# Patient Record
Sex: MALE | Race: WHITE | Employment: OTHER | URBAN - METROPOLITAN AREA
[De-identification: names, ages, dates, MRNs, and addresses within clinical notes are randomized per-mention and may not be internally consistent; named-entity substitution may affect disease eponyms.]

---

## 2017-03-08 ENCOUNTER — LAB ENCOUNTER (OUTPATIENT)
Dept: LAB | Age: 72
End: 2017-03-08
Attending: INTERNAL MEDICINE
Payer: MEDICARE

## 2017-03-08 DIAGNOSIS — Z12.5 ENCOUNTER FOR PROSTATE CANCER SCREENING: ICD-10-CM

## 2017-03-08 DIAGNOSIS — Z11.59 NEED FOR HEPATITIS C SCREENING TEST: ICD-10-CM

## 2017-03-08 DIAGNOSIS — E11.9 TYPE II OR UNSPECIFIED TYPE DIABETES MELLITUS WITHOUT MENTION OF COMPLICATION, NOT STATED AS UNCONTROLLED: ICD-10-CM

## 2017-03-08 DIAGNOSIS — I10 ESSENTIAL HYPERTENSION: ICD-10-CM

## 2017-03-08 LAB
ALBUMIN SERPL BCP-MCNC: 4.2 G/DL (ref 3.5–4.8)
ALBUMIN/GLOB SERPL: 1.2 {RATIO} (ref 1–2)
ALP SERPL-CCNC: 50 U/L (ref 32–100)
ALT SERPL-CCNC: 37 U/L (ref 17–63)
ANION GAP SERPL CALC-SCNC: 9 MMOL/L (ref 0–18)
AST SERPL-CCNC: 31 U/L (ref 15–41)
BACTERIA UR QL AUTO: NEGATIVE /HPF
BASOPHILS # BLD: 0 K/UL (ref 0–0.2)
BASOPHILS NFR BLD: 1 %
BILIRUB SERPL-MCNC: 1.1 MG/DL (ref 0.3–1.2)
BILIRUB UR QL: NEGATIVE
BUN SERPL-MCNC: 13 MG/DL (ref 8–20)
BUN/CREAT SERPL: 10.4 (ref 10–20)
CALCIUM SERPL-MCNC: 9.3 MG/DL (ref 8.5–10.5)
CHLORIDE SERPL-SCNC: 102 MMOL/L (ref 95–110)
CHOLEST SERPL-MCNC: 159 MG/DL (ref 110–200)
CLARITY UR: CLEAR
CO2 SERPL-SCNC: 29 MMOL/L (ref 22–32)
COLOR UR: YELLOW
CREAT SERPL-MCNC: 1.25 MG/DL (ref 0.5–1.5)
EOSINOPHIL # BLD: 0.1 K/UL (ref 0–0.7)
EOSINOPHIL NFR BLD: 1 %
ERYTHROCYTE [DISTWIDTH] IN BLOOD BY AUTOMATED COUNT: 14.3 % (ref 11–15)
GLOBULIN PLAS-MCNC: 3.4 G/DL (ref 2.5–3.7)
GLUCOSE SERPL-MCNC: 104 MG/DL (ref 70–99)
GLUCOSE UR-MCNC: NEGATIVE MG/DL
HCT VFR BLD AUTO: 45 % (ref 41–52)
HDLC SERPL-MCNC: 30 MG/DL
HGB BLD-MCNC: 14.9 G/DL (ref 13.5–17.5)
HYALINE CASTS #/AREA URNS AUTO: 1 /LPF
KETONES UR-MCNC: NEGATIVE MG/DL
LDLC SERPL CALC-MCNC: 106 MG/DL (ref 0–99)
LEUKOCYTE ESTERASE UR QL STRIP.AUTO: NEGATIVE
LYMPHOCYTES # BLD: 2.9 K/UL (ref 1–4)
LYMPHOCYTES NFR BLD: 44 %
MCH RBC QN AUTO: 29.5 PG (ref 27–32)
MCHC RBC AUTO-ENTMCNC: 33.2 G/DL (ref 32–37)
MCV RBC AUTO: 88.9 FL (ref 80–100)
MONOCYTES # BLD: 0.7 K/UL (ref 0–1)
MONOCYTES NFR BLD: 11 %
NEUTROPHILS # BLD AUTO: 3 K/UL (ref 1.8–7.7)
NEUTROPHILS NFR BLD: 44 %
NITRITE UR QL STRIP.AUTO: NEGATIVE
NONHDLC SERPL-MCNC: 129 MG/DL
OSMOLALITY UR CALC.SUM OF ELEC: 290 MOSM/KG (ref 275–295)
PH UR: 6 [PH] (ref 5–8)
PLATELET # BLD AUTO: 160 K/UL (ref 140–400)
PMV BLD AUTO: 8.9 FL (ref 7.4–10.3)
POTASSIUM SERPL-SCNC: 4.5 MMOL/L (ref 3.3–5.1)
PROT SERPL-MCNC: 7.6 G/DL (ref 5.9–8.4)
PROT UR-MCNC: NEGATIVE MG/DL
PSA SERPL-MCNC: 0.5 NG/ML (ref 0–4)
RBC # BLD AUTO: 5.05 M/UL (ref 4.5–5.9)
RBC #/AREA URNS AUTO: 0 /HPF
SODIUM SERPL-SCNC: 140 MMOL/L (ref 136–144)
SP GR UR STRIP: 1.01 (ref 1–1.03)
TRIGL SERPL-MCNC: 113 MG/DL (ref 1–149)
TSH SERPL-ACNC: 1.48 UIU/ML (ref 0.34–5.6)
UROBILINOGEN UR STRIP-ACNC: <2
VIT C UR-MCNC: NEGATIVE MG/DL
WBC # BLD AUTO: 6.7 K/UL (ref 4–11)
WBC #/AREA URNS AUTO: 1 /HPF

## 2017-03-08 PROCEDURE — 86803 HEPATITIS C AB TEST: CPT

## 2017-03-08 PROCEDURE — 84443 ASSAY THYROID STIM HORMONE: CPT

## 2017-03-08 PROCEDURE — 83036 HEMOGLOBIN GLYCOSYLATED A1C: CPT

## 2017-03-08 PROCEDURE — 80053 COMPREHEN METABOLIC PANEL: CPT

## 2017-03-08 PROCEDURE — 85025 COMPLETE CBC W/AUTO DIFF WBC: CPT

## 2017-03-08 PROCEDURE — 80061 LIPID PANEL: CPT

## 2017-03-08 PROCEDURE — 81001 URINALYSIS AUTO W/SCOPE: CPT

## 2017-03-08 PROCEDURE — 36415 COLL VENOUS BLD VENIPUNCTURE: CPT

## 2017-03-09 LAB
HBA1C MFR BLD: 5.8 % (ref 4–6)
HCV AB SERPL QL IA: NONREACTIVE

## 2017-03-13 ENCOUNTER — OFFICE VISIT (OUTPATIENT)
Dept: INTERNAL MEDICINE CLINIC | Facility: CLINIC | Age: 72
End: 2017-03-13

## 2017-03-13 VITALS
BODY MASS INDEX: 27.49 KG/M2 | TEMPERATURE: 99 F | HEART RATE: 68 BPM | WEIGHT: 192 LBS | SYSTOLIC BLOOD PRESSURE: 134 MMHG | HEIGHT: 70 IN | DIASTOLIC BLOOD PRESSURE: 64 MMHG

## 2017-03-13 DIAGNOSIS — I10 ESSENTIAL HYPERTENSION: ICD-10-CM

## 2017-03-13 DIAGNOSIS — Z00.00 ENCOUNTER FOR MEDICARE ANNUAL WELLNESS EXAM: Primary | ICD-10-CM

## 2017-03-13 DIAGNOSIS — E11.8 TYPE 2 DIABETES MELLITUS WITH COMPLICATION, WITHOUT LONG-TERM CURRENT USE OF INSULIN (HCC): ICD-10-CM

## 2017-03-13 DIAGNOSIS — Z00.00 ENCOUNTER FOR INITIAL PREVENTIVE PHYSICAL EXAMINATION COVERED BY MEDICARE: ICD-10-CM

## 2017-03-13 PROCEDURE — 93010 ELECTROCARDIOGRAM REPORT: CPT | Performed by: INTERNAL MEDICINE

## 2017-03-13 PROCEDURE — 99214 OFFICE O/P EST MOD 30 MIN: CPT | Performed by: INTERNAL MEDICINE

## 2017-03-13 PROCEDURE — 93005 ELECTROCARDIOGRAM TRACING: CPT | Performed by: INTERNAL MEDICINE

## 2017-03-13 PROCEDURE — G0463 HOSPITAL OUTPT CLINIC VISIT: HCPCS | Performed by: INTERNAL MEDICINE

## 2017-03-13 PROCEDURE — G0439 PPPS, SUBSEQ VISIT: HCPCS | Performed by: INTERNAL MEDICINE

## 2017-03-13 NOTE — PROGRESS NOTES
Collin Springer is a 66year old male. HPI:   1. Encounter for Medicare annual wellness exam -  He is here for annual [facility] wellness exam. He feels well and has no major complaints. Good energy, good appetite.      SR: no chest pain or sob, no gu or gi sx poor?: No    Type of Diet: Balanced    How does the patient maintain a good energy level?: Daily Walks; Other    How would you describe your daily physical activity?: Moderate    How would you describe your current health state?: Good    How do you maintain AWV/SWV)    Hearing Screening    Screening Method:  Questionnaire      I have a problem hearing over the telephone:  No I have trouble following   the conversations when two or more people are talking at the same time:    No   I have trouble understanding Diabetes Screening      HbgA1C   Annually GLYCOHEMOGLOBIN (HGA1C) (L) (%)   Date Value   03/11/2016 5.5     GLYCOHEMOGLOBIN (HGA1C) (%)   Date Value   03/08/2017 5.8    No flowsheet data found.     Fasting Blood Sugar (FSB) Annually   GLUCOSE (mg/dL)   Da Creatinine  Annually CREATININE (mg/dL)   Date Value   03/08/2017 1.25     CREATININE (P) (mg/dL)   Date Value   03/11/2016 1.13    No flowsheet data found. Digoxin Serum Conc  Annually No results found for: DIGOXIN No flowsheet data found.     Diabetes

## 2017-07-05 RX ORDER — ENALAPRIL MALEATE 10 MG/1
TABLET ORAL
Qty: 90 TABLET | Refills: 3 | Status: SHIPPED | OUTPATIENT
Start: 2017-07-05 | End: 2018-08-01

## 2018-03-07 ENCOUNTER — TELEPHONE (OUTPATIENT)
Dept: INTERNAL MEDICINE CLINIC | Facility: CLINIC | Age: 73
End: 2018-03-07

## 2018-03-07 DIAGNOSIS — Z12.5 SCREENING FOR PROSTATE CANCER: ICD-10-CM

## 2018-03-07 DIAGNOSIS — E13.9 DIABETES 1.5, MANAGED AS TYPE 2 (HCC): ICD-10-CM

## 2018-03-07 DIAGNOSIS — Z00.00 ANNUAL PHYSICAL EXAM: ICD-10-CM

## 2018-03-07 DIAGNOSIS — I10 ESSENTIAL HYPERTENSION: Primary | ICD-10-CM

## 2018-03-07 NOTE — TELEPHONE ENCOUNTER
Patient is asking if he needs lab work prior to 3/14/2018 appointment with Dr. Roberta Knapp. Please call him back at [phone], may leave message on machine.   Routed to clinical.

## 2018-03-08 ENCOUNTER — LAB ENCOUNTER (OUTPATIENT)
Dept: LAB | Age: 73
End: 2018-03-08
Attending: INTERNAL MEDICINE
Payer: MEDICARE

## 2018-03-08 DIAGNOSIS — I10 ESSENTIAL HYPERTENSION: ICD-10-CM

## 2018-03-08 DIAGNOSIS — Z00.00 ANNUAL PHYSICAL EXAM: ICD-10-CM

## 2018-03-08 DIAGNOSIS — E13.9 DIABETES 1.5, MANAGED AS TYPE 2 (HCC): ICD-10-CM

## 2018-03-08 DIAGNOSIS — Z12.5 SCREENING FOR PROSTATE CANCER: ICD-10-CM

## 2018-03-08 LAB
ALBUMIN SERPL BCP-MCNC: 4.3 G/DL (ref 3.5–4.8)
ALBUMIN/GLOB SERPL: 1.4 {RATIO} (ref 1–2)
ALP SERPL-CCNC: 54 U/L (ref 32–100)
ALT SERPL-CCNC: 33 U/L (ref 17–63)
ANION GAP SERPL CALC-SCNC: 9 MMOL/L (ref 0–18)
AST SERPL-CCNC: 26 U/L (ref 15–41)
BASOPHILS # BLD: 0 K/UL (ref 0–0.2)
BASOPHILS NFR BLD: 1 %
BILIRUB SERPL-MCNC: 0.9 MG/DL (ref 0.3–1.2)
BILIRUB UR QL: NEGATIVE
BUN SERPL-MCNC: 15 MG/DL (ref 8–20)
BUN/CREAT SERPL: 12.7 (ref 10–20)
CALCIUM SERPL-MCNC: 9.3 MG/DL (ref 8.5–10.5)
CHLORIDE SERPL-SCNC: 102 MMOL/L (ref 95–110)
CHOLEST SERPL-MCNC: 160 MG/DL (ref 110–200)
CLARITY UR: CLEAR
CO2 SERPL-SCNC: 27 MMOL/L (ref 22–32)
COLOR UR: YELLOW
CREAT SERPL-MCNC: 1.18 MG/DL (ref 0.5–1.5)
EOSINOPHIL # BLD: 0.1 K/UL (ref 0–0.7)
EOSINOPHIL NFR BLD: 2 %
ERYTHROCYTE [DISTWIDTH] IN BLOOD BY AUTOMATED COUNT: 13.8 % (ref 11–15)
GLOBULIN PLAS-MCNC: 3.1 G/DL (ref 2.5–3.7)
GLUCOSE SERPL-MCNC: 99 MG/DL (ref 70–99)
GLUCOSE UR-MCNC: NEGATIVE MG/DL
HBA1C MFR BLD: 5.7 % (ref 4–6)
HCT VFR BLD AUTO: 44.3 % (ref 41–52)
HDLC SERPL-MCNC: 33 MG/DL
HGB BLD-MCNC: 14.6 G/DL (ref 13.5–17.5)
HGB UR QL STRIP.AUTO: NEGATIVE
KETONES UR-MCNC: NEGATIVE MG/DL
LDLC SERPL CALC-MCNC: 106 MG/DL (ref 0–99)
LEUKOCYTE ESTERASE UR QL STRIP.AUTO: NEGATIVE
LYMPHOCYTES # BLD: 3.5 K/UL (ref 1–4)
LYMPHOCYTES NFR BLD: 44 %
MCH RBC QN AUTO: 29.4 PG (ref 27–32)
MCHC RBC AUTO-ENTMCNC: 32.9 G/DL (ref 32–37)
MCV RBC AUTO: 89.2 FL (ref 80–100)
MONOCYTES # BLD: 0.8 K/UL (ref 0–1)
MONOCYTES NFR BLD: 11 %
NEUTROPHILS # BLD AUTO: 3.4 K/UL (ref 1.8–7.7)
NEUTROPHILS NFR BLD: 43 %
NITRITE UR QL STRIP.AUTO: NEGATIVE
NONHDLC SERPL-MCNC: 127 MG/DL
OSMOLALITY UR CALC.SUM OF ELEC: 287 MOSM/KG (ref 275–295)
PATIENT FASTING: YES
PH UR: 5 [PH] (ref 5–8)
PLATELET # BLD AUTO: 189 K/UL (ref 140–400)
PMV BLD AUTO: 9.3 FL (ref 7.4–10.3)
POTASSIUM SERPL-SCNC: 4.5 MMOL/L (ref 3.3–5.1)
PROT SERPL-MCNC: 7.4 G/DL (ref 5.9–8.4)
PROT UR-MCNC: NEGATIVE MG/DL
PSA SERPL-MCNC: 0.7 NG/ML (ref 0–4)
RBC # BLD AUTO: 4.97 M/UL (ref 4.5–5.9)
SODIUM SERPL-SCNC: 138 MMOL/L (ref 136–144)
SP GR UR STRIP: 1.02 (ref 1–1.03)
TRIGL SERPL-MCNC: 107 MG/DL (ref 1–149)
TSH SERPL-ACNC: 1.49 UIU/ML (ref 0.45–5.33)
UROBILINOGEN UR STRIP-ACNC: <2
VIT C UR-MCNC: NEGATIVE MG/DL
WBC # BLD AUTO: 7.9 K/UL (ref 4–11)

## 2018-03-08 PROCEDURE — 85025 COMPLETE CBC W/AUTO DIFF WBC: CPT

## 2018-03-08 PROCEDURE — 81003 URINALYSIS AUTO W/O SCOPE: CPT

## 2018-03-08 PROCEDURE — 84443 ASSAY THYROID STIM HORMONE: CPT

## 2018-03-08 PROCEDURE — 83036 HEMOGLOBIN GLYCOSYLATED A1C: CPT

## 2018-03-08 PROCEDURE — 80061 LIPID PANEL: CPT

## 2018-03-08 PROCEDURE — 36415 COLL VENOUS BLD VENIPUNCTURE: CPT

## 2018-03-08 PROCEDURE — 80053 COMPREHEN METABOLIC PANEL: CPT

## 2018-03-14 ENCOUNTER — OFFICE VISIT (OUTPATIENT)
Dept: INTERNAL MEDICINE CLINIC | Facility: CLINIC | Age: 73
End: 2018-03-14

## 2018-03-14 VITALS
HEIGHT: 70 IN | TEMPERATURE: 98 F | HEART RATE: 70 BPM | OXYGEN SATURATION: 99 % | WEIGHT: 186 LBS | BODY MASS INDEX: 26.63 KG/M2 | DIASTOLIC BLOOD PRESSURE: 74 MMHG | SYSTOLIC BLOOD PRESSURE: 128 MMHG

## 2018-03-14 DIAGNOSIS — I10 ESSENTIAL HYPERTENSION: ICD-10-CM

## 2018-03-14 DIAGNOSIS — Z00.00 ENCOUNTER FOR MEDICARE ANNUAL WELLNESS EXAM: Primary | ICD-10-CM

## 2018-03-14 DIAGNOSIS — Z23 IMMUNIZATION DUE: ICD-10-CM

## 2018-03-14 DIAGNOSIS — I73.00 RAYNAUD'S DISEASE WITHOUT GANGRENE: ICD-10-CM

## 2018-03-14 DIAGNOSIS — E11.8 TYPE 2 DIABETES MELLITUS WITH COMPLICATION, WITHOUT LONG-TERM CURRENT USE OF INSULIN (HCC): ICD-10-CM

## 2018-03-14 PROCEDURE — G0439 PPPS, SUBSEQ VISIT: HCPCS | Performed by: INTERNAL MEDICINE

## 2018-03-14 PROCEDURE — G0009 ADMIN PNEUMOCOCCAL VACCINE: HCPCS | Performed by: INTERNAL MEDICINE

## 2018-03-14 PROCEDURE — 90670 PCV13 VACCINE IM: CPT | Performed by: INTERNAL MEDICINE

## 2018-03-14 NOTE — PROGRESS NOTES
Waleska Esquivel is a 67year old male. HPI:   1. Encounter for Medicare annual wellness exam  He is here for annual [facility] wellness exam. He feels well, good energy good appetite      SR: no chest pain or sob, no gu or gi sx.       2. Essential hypertension nourished,in no apparent distress  SKIN: no rashes,no suspicious lesions  HEENT: atraumatic, normocephalic,ears and throat are clear  NECK: supple,no adenopathy,no bruits  LUNGS: clear to auscultation  CARDIO: RRR without murmur  GI: good BS's,no masses, H

## 2018-03-28 ENCOUNTER — HOSPITAL ENCOUNTER (OUTPATIENT)
Dept: ULTRASOUND IMAGING | Age: 73
Discharge: HOME | End: 2018-03-28
Attending: INTERNAL MEDICINE
Payer: MEDICARE

## 2018-03-28 DIAGNOSIS — Z00.00 ENCOUNTER FOR MEDICARE ANNUAL WELLNESS EXAM: ICD-10-CM

## 2018-03-28 PROCEDURE — 76770 US EXAM ABDO BACK WALL COMP: CPT | Performed by: INTERNAL MEDICINE

## 2018-04-13 ENCOUNTER — TELEPHONE (OUTPATIENT)
Dept: INTERNAL MEDICINE CLINIC | Facility: CLINIC | Age: 73
End: 2018-04-13

## 2018-04-13 NOTE — TELEPHONE ENCOUNTER
Called patient and spoke to wife Tsering Pope per hipaa. Relayed Dr Vicky Shea message. She verbalized understanding.

## 2018-08-01 RX ORDER — ENALAPRIL MALEATE 10 MG/1
TABLET ORAL
Qty: 90 TABLET | Refills: 3 | Status: SHIPPED | OUTPATIENT
Start: 2018-08-01 | End: 2019-09-04

## 2018-11-06 ENCOUNTER — NURSE ONLY (OUTPATIENT)
Dept: INTERNAL MEDICINE CLINIC | Facility: CLINIC | Age: 73
End: 2018-11-06
Payer: MEDICARE

## 2018-11-06 PROCEDURE — G0008 ADMIN INFLUENZA VIRUS VAC: HCPCS | Performed by: INTERNAL MEDICINE

## 2018-11-06 PROCEDURE — 90653 IIV ADJUVANT VACCINE IM: CPT | Performed by: INTERNAL MEDICINE

## 2019-01-25 ENCOUNTER — TELEPHONE (OUTPATIENT)
Dept: INTERNAL MEDICINE CLINIC | Facility: CLINIC | Age: 74
End: 2019-01-25

## 2019-01-25 ENCOUNTER — HOSPITAL ENCOUNTER (EMERGENCY)
Facility: HOSPITAL | Age: 74
Discharge: HOME | End: 2019-01-25
Attending: EMERGENCY MEDICINE
Payer: MEDICARE

## 2019-01-25 ENCOUNTER — APPOINTMENT (OUTPATIENT)
Dept: CT IMAGING | Facility: HOSPITAL | Age: 74
End: 2019-01-25
Attending: EMERGENCY MEDICINE
Payer: MEDICARE

## 2019-01-25 VITALS
OXYGEN SATURATION: 98 % | RESPIRATION RATE: 15 BRPM | SYSTOLIC BLOOD PRESSURE: 136 MMHG | TEMPERATURE: 98 F | DIASTOLIC BLOOD PRESSURE: 83 MMHG | HEART RATE: 82 BPM

## 2019-01-25 DIAGNOSIS — A09 INFECTIOUS ENTERITIS, UNSPECIFIED INFECTIOUS AGENT: Primary | ICD-10-CM

## 2019-01-25 DIAGNOSIS — R19.7 NAUSEA VOMITING AND DIARRHEA: ICD-10-CM

## 2019-01-25 DIAGNOSIS — R11.2 NAUSEA VOMITING AND DIARRHEA: ICD-10-CM

## 2019-01-25 LAB
ALBUMIN SERPL BCP-MCNC: 4.1 G/DL (ref 3.5–4.8)
ALP SERPL-CCNC: 52 U/L (ref 32–100)
ALT SERPL-CCNC: 42 U/L (ref 17–63)
ANION GAP SERPL CALC-SCNC: 15 MMOL/L (ref 0–18)
AST SERPL-CCNC: 38 U/L (ref 15–41)
BASOPHILS # BLD: 0 K/UL (ref 0–0.2)
BASOPHILS NFR BLD: 1 %
BILIRUB DIRECT SERPL-MCNC: 0.2 MG/DL (ref 0–0.2)
BILIRUB SERPL-MCNC: 0.9 MG/DL (ref 0.3–1.2)
BUN SERPL-MCNC: 17 MG/DL (ref 8–20)
BUN/CREAT SERPL: 14 (ref 10–20)
CALCIUM SERPL-MCNC: 8.6 MG/DL (ref 8.5–10.5)
CHLORIDE SERPL-SCNC: 99 MMOL/L (ref 95–110)
CO2 SERPL-SCNC: 22 MMOL/L (ref 22–32)
CREAT SERPL-MCNC: 1.21 MG/DL (ref 0.5–1.5)
EOSINOPHIL # BLD: 0.1 K/UL (ref 0–0.7)
EOSINOPHIL NFR BLD: 1 %
ERYTHROCYTE [DISTWIDTH] IN BLOOD BY AUTOMATED COUNT: 14.3 % (ref 11–15)
GLUCOSE SERPL-MCNC: 103 MG/DL (ref 70–99)
HCT VFR BLD AUTO: 45.3 % (ref 41–52)
HGB BLD-MCNC: 15.2 G/DL (ref 13.5–17.5)
LIPASE SERPL-CCNC: 34 U/L (ref 22–51)
LYMPHOCYTES # BLD: 1.7 K/UL (ref 1–4)
LYMPHOCYTES NFR BLD: 23 %
MCH RBC QN AUTO: 29.7 PG (ref 27–32)
MCHC RBC AUTO-ENTMCNC: 33.6 G/DL (ref 32–37)
MCV RBC AUTO: 88.5 FL (ref 80–100)
MONOCYTES # BLD: 0.8 K/UL (ref 0–1)
MONOCYTES NFR BLD: 12 %
NEUTROPHILS # BLD AUTO: 4.7 K/UL (ref 1.8–7.7)
NEUTROPHILS NFR BLD: 64 %
OSMOLALITY UR CALC.SUM OF ELEC: 284 MOSM/KG (ref 275–295)
PLATELET # BLD AUTO: 186 K/UL (ref 140–400)
PMV BLD AUTO: 8.3 FL (ref 7.4–10.3)
POTASSIUM SERPL-SCNC: 3.4 MMOL/L (ref 3.3–5.1)
PROT SERPL-MCNC: 7.6 G/DL (ref 5.9–8.4)
RBC # BLD AUTO: 5.12 M/UL (ref 4.5–5.9)
SODIUM SERPL-SCNC: 136 MMOL/L (ref 136–144)
WBC # BLD AUTO: 7.3 K/UL (ref 4–11)

## 2019-01-25 PROCEDURE — 87507 IADNA-DNA/RNA PROBE TQ 12-25: CPT | Performed by: EMERGENCY MEDICINE

## 2019-01-25 PROCEDURE — 85025 COMPLETE CBC W/AUTO DIFF WBC: CPT | Performed by: EMERGENCY MEDICINE

## 2019-01-25 PROCEDURE — 80048 BASIC METABOLIC PNL TOTAL CA: CPT | Performed by: EMERGENCY MEDICINE

## 2019-01-25 PROCEDURE — 83690 ASSAY OF LIPASE: CPT | Performed by: EMERGENCY MEDICINE

## 2019-01-25 PROCEDURE — 80076 HEPATIC FUNCTION PANEL: CPT | Performed by: EMERGENCY MEDICINE

## 2019-01-25 PROCEDURE — 85025 COMPLETE CBC W/AUTO DIFF WBC: CPT

## 2019-01-25 PROCEDURE — 96360 HYDRATION IV INFUSION INIT: CPT

## 2019-01-25 PROCEDURE — 74177 CT ABD & PELVIS W/CONTRAST: CPT | Performed by: EMERGENCY MEDICINE

## 2019-01-25 PROCEDURE — 99284 EMERGENCY DEPT VISIT MOD MDM: CPT

## 2019-01-25 PROCEDURE — 80048 BASIC METABOLIC PNL TOTAL CA: CPT

## 2019-01-25 RX ORDER — SODIUM CHLORIDE 9 MG/ML
INJECTION, SOLUTION INTRAVENOUS ONCE
Status: DISCONTINUED | OUTPATIENT
Start: 2019-01-25 | End: 2019-01-25

## 2019-01-25 RX ORDER — ONDANSETRON 4 MG/1
4 TABLET, ORALLY DISINTEGRATING ORAL EVERY 8 HOURS PRN
Qty: 10 TABLET | Refills: 0 | Status: SHIPPED | OUTPATIENT
Start: 2019-01-25 | End: 2019-01-28

## 2019-01-25 NOTE — ED INITIAL ASSESSMENT (HPI)
Pt states \"yesterday afternoon had a bowel of chili and developed N/V/Diarrhea called PMD and told me to come to ED\"

## 2019-01-25 NOTE — TELEPHONE ENCOUNTER
Patient contacted, states yesterday he ate a bowl of chili for lunch and shortly after went outside to shovel. He came back inside and was not feeling very well, chocking feeling and started throwing up. Vomited a few times yesterday, no more today.  During

## 2019-01-25 NOTE — TELEPHONE ENCOUNTER
Pt threw up all lest night have diarrhea now is having some abdominal discomfort also constant belching.  Like to speak to

## 2019-01-26 NOTE — ED PROVIDER NOTES
Patient Seen in: [facility] Emergency Department    History   Patient presents with:  Abdomen/Flank Pain (GI/)  Nausea/Vomiting/Diarrhea (gastrointestinal)    Stated Complaint: NVD    HPI    She presents the emergency department complaining of a Current:/75   Pulse 76   Temp 98.2 °F (36.8 °C) (Oral)   Resp 18   SpO2 98%         Physical Exam   Constitutional: He is oriented to person, place, and time. He appears well-developed and well-nourished. No distress.    HENT:   Head: Normocepha findings: Ct Abdomen Pelvis Iv Contrast, No Oral (er)    Result Date: 1/25/2019  CONCLUSION:  1. Nonspecific enteritis-likely infectious. 2. Hazy infiltration of the proximal small bowel mesentery.  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

## 2019-01-26 NOTE — ED NOTES
Dr Dortha Cheadle at bedside discussing with the pt and pts family the results of the labs and CT done.

## 2019-01-26 NOTE — TELEPHONE ENCOUNTER
Spoke with gurvinder, he was hydrated with a liter of fluid, improved and will be discharged home with as needed prescriptions, it appears he has viral enteritis after CT scanning, and feels comfortable with the discharge plan, he will follow-up as needed in

## 2019-01-28 ENCOUNTER — OFFICE VISIT (OUTPATIENT)
Dept: INTERNAL MEDICINE CLINIC | Facility: CLINIC | Age: 74
End: 2019-01-28
Payer: MEDICARE

## 2019-01-28 VITALS
WEIGHT: 190.38 LBS | HEART RATE: 77 BPM | SYSTOLIC BLOOD PRESSURE: 140 MMHG | OXYGEN SATURATION: 98 % | DIASTOLIC BLOOD PRESSURE: 68 MMHG | BODY MASS INDEX: 27.25 KG/M2 | HEIGHT: 70 IN | TEMPERATURE: 99 F

## 2019-01-28 DIAGNOSIS — I73.9 PVD (PERIPHERAL VASCULAR DISEASE) (HCC): ICD-10-CM

## 2019-01-28 DIAGNOSIS — I10 ESSENTIAL HYPERTENSION: ICD-10-CM

## 2019-01-28 DIAGNOSIS — K52.9 GASTROENTERITIS: Primary | ICD-10-CM

## 2019-01-28 LAB
ADENOVIRUS F 40/41 PCR: NEGATIVE
ASTROVIRUS PCR: NEGATIVE
C CAYETANENSIS DNA SPEC QL NAA+PROBE: NEGATIVE
C. DIFFICILE TOXIN A/B PCR: NEGATIVE
CAMPY SP DNA.DIARRHEA STL QL NAA+PROBE: NEGATIVE
CRYPTOSP DNA SPEC QL NAA+PROBE: NEGATIVE
EAEC PAA PLAS AGGR+AATA ST NAA+NON-PRB: NEGATIVE
EC STX1+STX2 + H7 FLIC SPEC NAA+PROBE: NEGATIVE
ENTAMOEBA HISTOLYTICA PCR: NEGATIVE
EPEC EAE GENE STL QL NAA+NON-PROBE: NEGATIVE
ETEC LTA+ST1A+ST1B TOX ST NAA+NON-PROBE: NEGATIVE
GIARDIA LAMBLIA PCR: NEGATIVE
NOROVIRUS GI/GII PCR: POSITIVE
P SHIGELLOIDES DNA STL QL NAA+PROBE: NEGATIVE
ROTAVIRUS A PCR: NEGATIVE
SALMONELLA DNA SPEC QL NAA+PROBE: NEGATIVE
SAPOVIRUS PCR: NEGATIVE
SHIGELLA SP+EIEC IPAH ST NAA+NON-PROBE: NEGATIVE
V CHOLERAE DNA SPEC QL NAA+PROBE: NEGATIVE
VIBRIO DNA SPEC NAA+PROBE: NEGATIVE
YERSINIA DNA SPEC NAA+PROBE: NEGATIVE

## 2019-01-28 PROCEDURE — G0463 HOSPITAL OUTPT CLINIC VISIT: HCPCS | Performed by: INTERNAL MEDICINE

## 2019-01-28 PROCEDURE — 99214 OFFICE O/P EST MOD 30 MIN: CPT | Performed by: INTERNAL MEDICINE

## 2019-01-28 NOTE — PROGRESS NOTES
Mikal Giron is a 75year old male. HPI:   Last Thursday, he ate chili around noon and then vomited 3X about 2:30 pm - then diarrhea 3-4 that afternoon and 3 x during the night. He had mild abdo cramping. . I sent him to ED and CT abdo negative in te °F (37 °C) (Oral)   Ht 5' 10\" (1.778 m)   Wt 190 lb 6 oz (86.4 kg)   SpO2 98%   BMI 27.32 kg/m²   GENERAL: well developed, well nourished,in no apparent distress  SKIN: no rashes,no suspicious lesions  HEENT: atraumatic, normocephalic,ears and throat are

## 2019-01-29 ENCOUNTER — TELEPHONE (OUTPATIENT)
Dept: INTERNAL MEDICINE CLINIC | Facility: CLINIC | Age: 74
End: 2019-01-29

## 2019-01-29 NOTE — TELEPHONE ENCOUNTER
I relayed message to patient and he verbalized understanding. Invited patient to call back with any questions or concerns.

## 2019-01-29 NOTE — TELEPHONE ENCOUNTER
Tell him he tested positive for norovirus -  Can get from contaminated food or water, restaurants and can start up to 48 hiurs after exposure.  Chrystal Almazananchi probably had same thing  - no need to test her since she is back to normal. Good handwashing technique [facility]

## 2019-02-07 ENCOUNTER — HOSPITAL ENCOUNTER (OUTPATIENT)
Dept: ULTRASOUND IMAGING | Facility: HOSPITAL | Age: 74
Discharge: HOME | End: 2019-02-07
Attending: INTERNAL MEDICINE
Payer: MEDICARE

## 2019-02-07 DIAGNOSIS — I73.9 PVD (PERIPHERAL VASCULAR DISEASE) (HCC): ICD-10-CM

## 2019-02-07 PROCEDURE — 93923 UPR/LXTR ART STDY 3+ LVLS: CPT | Performed by: INTERNAL MEDICINE

## 2019-02-24 ENCOUNTER — TELEPHONE (OUTPATIENT)
Dept: INTERNAL MEDICINE CLINIC | Facility: CLINIC | Age: 74
End: 2019-02-24

## 2019-03-29 ENCOUNTER — APPOINTMENT (OUTPATIENT)
Dept: LAB | Age: 74
End: 2019-03-29
Attending: INTERNAL MEDICINE
Payer: MEDICARE

## 2019-03-29 ENCOUNTER — OFFICE VISIT (OUTPATIENT)
Dept: INTERNAL MEDICINE CLINIC | Facility: CLINIC | Age: 74
End: 2019-03-29
Payer: MEDICARE

## 2019-03-29 VITALS
DIASTOLIC BLOOD PRESSURE: 70 MMHG | WEIGHT: 192.19 LBS | HEIGHT: 70 IN | OXYGEN SATURATION: 95 % | SYSTOLIC BLOOD PRESSURE: 142 MMHG | RESPIRATION RATE: 12 BRPM | TEMPERATURE: 98 F | HEART RATE: 74 BPM | BODY MASS INDEX: 27.52 KG/M2

## 2019-03-29 DIAGNOSIS — Z00.00 ENCOUNTER FOR MEDICARE ANNUAL WELLNESS EXAM: Primary | ICD-10-CM

## 2019-03-29 DIAGNOSIS — I10 ESSENTIAL HYPERTENSION: ICD-10-CM

## 2019-03-29 DIAGNOSIS — Z00.00 ENCOUNTER FOR MEDICARE ANNUAL WELLNESS EXAM: ICD-10-CM

## 2019-03-29 DIAGNOSIS — E11.8 TYPE 2 DIABETES MELLITUS WITH COMPLICATION, WITHOUT LONG-TERM CURRENT USE OF INSULIN (HCC): ICD-10-CM

## 2019-03-29 DIAGNOSIS — K52.9 GASTROENTERITIS: ICD-10-CM

## 2019-03-29 DIAGNOSIS — I73.00 RAYNAUD'S DISEASE WITHOUT GANGRENE: ICD-10-CM

## 2019-03-29 PROCEDURE — 36415 COLL VENOUS BLD VENIPUNCTURE: CPT

## 2019-03-29 PROCEDURE — 84443 ASSAY THYROID STIM HORMONE: CPT

## 2019-03-29 PROCEDURE — 80061 LIPID PANEL: CPT

## 2019-03-29 PROCEDURE — G0439 PPPS, SUBSEQ VISIT: HCPCS | Performed by: INTERNAL MEDICINE

## 2019-03-29 PROCEDURE — 99214 OFFICE O/P EST MOD 30 MIN: CPT | Performed by: INTERNAL MEDICINE

## 2019-03-29 PROCEDURE — G0463 HOSPITAL OUTPT CLINIC VISIT: HCPCS | Performed by: INTERNAL MEDICINE

## 2019-03-29 NOTE — PROGRESS NOTES
Raven Brenner is a 75year old male. HPI:   He is here for annual [facility] wellness exam.      No ED visits since Jan when he had Norovirus    He has been doing well and no complaints.      He had colonoscopy 2014 - 6 hyperplastic polyps      SR: no chest p clear  NECK: supple,no adenopathy,no bruits  LUNGS: clear to auscultation  CARDIO: RRR without murmur  GI: good BS's,no masses, HSM or tenderness  EXTREMITIES: no cyanosis, clubbing or edema  : no testicular masses or inguinal hernias  Rectal: no masses, Advance Directives     Do you have a healthcare power of ?: No    Do you have a living will?: No     Hearing Assessment (Required for AWV/SWV)      Hearing Screening    Time taken:  3/29/2019  8:21 AM  Entry User:  Mary Rossi RN  Screen insurer. Please check with your insurance carrier before scheduling to verify coverage.     PREVENTATIVE SERVICES  INDICATIONS AND SCHEDULE Internal Lab or Procedure External Lab or Procedure   Diabetes Screening      HbgA1C   Annually GLYCOHEMOGLOBIN (HgA1 diuretics)    Potassium  Annually Potassium (mmol/L)   Date Value   01/25/2019 3.4     POTASSIUM (P) (mmol/L)   Date Value   03/11/2016 4.3    No flowsheet data found.     Creatinine  Annually Creatinine (mg/dL)   Date Value   01/25/2019 1.21     CREATININE

## 2019-03-31 ENCOUNTER — TELEPHONE (OUTPATIENT)
Dept: INTERNAL MEDICINE CLINIC | Facility: CLINIC | Age: 74
End: 2019-03-31

## 2019-09-04 RX ORDER — ENALAPRIL MALEATE 10 MG/1
TABLET ORAL
Qty: 90 TABLET | Refills: 1 | Status: SHIPPED | OUTPATIENT
Start: 2019-09-04 | End: 2020-03-12

## 2020-03-12 RX ORDER — ENALAPRIL MALEATE 10 MG/1
TABLET ORAL
Qty: 90 TABLET | Refills: 3 | Status: SHIPPED | OUTPATIENT
Start: 2020-03-12 | End: 2021-04-21

## 2020-03-20 ENCOUNTER — TELEPHONE (OUTPATIENT)
Dept: INTERNAL MEDICINE CLINIC | Facility: CLINIC | Age: 75
End: 2020-03-20

## 2020-03-20 RX ORDER — AZITHROMYCIN 250 MG/1
TABLET, FILM COATED ORAL
Qty: 6 TABLET | Refills: 0 | Status: SHIPPED | OUTPATIENT
Start: 2020-03-20 | End: 2020-06-29 | Stop reason: ALTCHOICE

## 2020-03-20 RX ORDER — PROMETHAZINE HYDROCHLORIDE AND CODEINE PHOSPHATE 6.25; 1 MG/5ML; MG/5ML
5 SYRUP ORAL EVERY 4 HOURS PRN
Qty: 180 ML | Refills: 1 | Status: SHIPPED | OUTPATIENT
Start: 2020-03-20 | End: 2020-03-30

## 2020-03-20 NOTE — TELEPHONE ENCOUNTER
Please advise - called patient who states last Sunday he started with postnasal drip , coughing at night so he cannot sleep, no SOB , no recent traveling , did not have contact with anyone testing positvive for COVID 19 as far as he knows In his house sin

## 2020-03-20 NOTE — TELEPHONE ENCOUNTER
Per daughter, wants patient called as he is not feeling well. He has a temperature 100.4. Patient has nasal drip & sore throat. Some coughing. This is all the daughter knew & wants patient called directly.

## 2020-03-20 NOTE — TELEPHONE ENCOUNTER
Spoke to patient's wife, Radu (OK per HIPPA), and informed her MD sent a z-ashish and cough syrup with codeine to the [place] in , she verbalized understanding and will pass on to patient.  Advised she also have patient call back if his symptoms do not improve

## 2020-05-10 ENCOUNTER — TELEPHONE (OUTPATIENT)
Dept: INTERNAL MEDICINE CLINIC | Facility: CLINIC | Age: 75
End: 2020-05-10

## 2020-05-10 NOTE — TELEPHONE ENCOUNTER
Reports muscles cramps intermittently \"all over\". Feels good when he is resting but when he tries to move muscles cramp up. He started gatorade this week which has not help. He is eating well. Has one episode of watery stool yesterday but no stools yet today. Can try magnesium once daily, but discussed the possibility of checking labs to understand why he feels this way. He is hesitant bc of risk of covid. Will try magnesium for 1-2 days, if not better, he will call Dr. Maryuri Benedict this week.      FYI to Dr. Kyle Mckeon (last labs 1/2019)

## 2020-06-15 ENCOUNTER — TELEPHONE (OUTPATIENT)
Dept: INTERNAL MEDICINE CLINIC | Facility: CLINIC | Age: 75
End: 2020-06-15

## 2020-06-15 DIAGNOSIS — E78.00 HYPERCHOLESTEREMIA: Primary | ICD-10-CM

## 2020-06-15 DIAGNOSIS — Z12.5 PROSTATE CANCER SCREENING: ICD-10-CM

## 2020-06-23 ENCOUNTER — LAB ENCOUNTER (OUTPATIENT)
Dept: LAB | Age: 75
End: 2020-06-23
Attending: INTERNAL MEDICINE
Payer: MEDICARE

## 2020-06-23 DIAGNOSIS — E78.00 HYPERCHOLESTEREMIA: ICD-10-CM

## 2020-06-23 DIAGNOSIS — Z12.5 PROSTATE CANCER SCREENING: ICD-10-CM

## 2020-06-23 PROCEDURE — 36415 COLL VENOUS BLD VENIPUNCTURE: CPT

## 2020-06-23 PROCEDURE — 80061 LIPID PANEL: CPT

## 2020-06-23 PROCEDURE — 85025 COMPLETE CBC W/AUTO DIFF WBC: CPT

## 2020-06-23 PROCEDURE — 81015 MICROSCOPIC EXAM OF URINE: CPT

## 2020-06-23 PROCEDURE — 84443 ASSAY THYROID STIM HORMONE: CPT

## 2020-06-23 PROCEDURE — 80053 COMPREHEN METABOLIC PANEL: CPT

## 2020-06-23 PROCEDURE — 81001 URINALYSIS AUTO W/SCOPE: CPT

## 2020-06-29 ENCOUNTER — OFFICE VISIT (OUTPATIENT)
Dept: INTERNAL MEDICINE CLINIC | Facility: CLINIC | Age: 75
End: 2020-06-29
Payer: COMMERCIAL

## 2020-06-29 VITALS
HEART RATE: 64 BPM | DIASTOLIC BLOOD PRESSURE: 70 MMHG | OXYGEN SATURATION: 99 % | BODY MASS INDEX: 27.77 KG/M2 | HEIGHT: 70 IN | WEIGHT: 194 LBS | SYSTOLIC BLOOD PRESSURE: 140 MMHG | TEMPERATURE: 98 F

## 2020-06-29 DIAGNOSIS — R91.1 NODULE OF APEX OF RIGHT LUNG: ICD-10-CM

## 2020-06-29 DIAGNOSIS — E11.8 TYPE 2 DIABETES MELLITUS WITH COMPLICATION, WITHOUT LONG-TERM CURRENT USE OF INSULIN (HCC): ICD-10-CM

## 2020-06-29 DIAGNOSIS — I73.9 CLAUDICATION OF LEFT LOWER EXTREMITY (HCC): ICD-10-CM

## 2020-06-29 DIAGNOSIS — N18.30 CKD (CHRONIC KIDNEY DISEASE) STAGE 3, GFR 30-59 ML/MIN (HCC): ICD-10-CM

## 2020-06-29 DIAGNOSIS — J41.0 SMOKERS' COUGH (HCC): ICD-10-CM

## 2020-06-29 DIAGNOSIS — Z00.00 ENCOUNTER FOR MEDICARE ANNUAL WELLNESS EXAM: Primary | ICD-10-CM

## 2020-06-29 DIAGNOSIS — R91.1 PULMONARY NODULE: ICD-10-CM

## 2020-06-29 DIAGNOSIS — I73.9 PVD (PERIPHERAL VASCULAR DISEASE) (HCC): ICD-10-CM

## 2020-06-29 DIAGNOSIS — I10 ESSENTIAL HYPERTENSION: ICD-10-CM

## 2020-06-29 PROCEDURE — 99397 PER PM REEVAL EST PAT 65+ YR: CPT | Performed by: INTERNAL MEDICINE

## 2020-06-29 PROCEDURE — 96160 PT-FOCUSED HLTH RISK ASSMT: CPT | Performed by: INTERNAL MEDICINE

## 2020-06-29 PROCEDURE — G0439 PPPS, SUBSEQ VISIT: HCPCS | Performed by: INTERNAL MEDICINE

## 2020-06-29 NOTE — PROGRESS NOTES
Jeronimo Zaragoza is a 75year old male. HPI:   He is here for annual [facility] wellness exam.     He has been seeing Dr Mely Saavedra for pain in the left knee and left hip - started in January 2020.   X-rays of hip and knee were normal and he was given steroid inje tobacco: Never Used    Alcohol use:  Yes      Alcohol/week: 0.0 standard drinks      Types: 2 - 4 Glasses of wine per week      Comment: occ    Drug use: No       REVIEW OF SYSTEMS:   GENERAL HEALTH: feels well otherwise  SKIN: denies any unusual skin lesio help    Driving: Able without help    Preparing your meals: Able without help    Managing money/bills: Able without help    Taking medications as prescribed: Able without help    Are you able to afford your medications?: Yes    Hearing Problems?: No     Fu Date Value   03/08/2018 5.7    No flowsheet data found.     Fasting Blood Sugar (FSB) Annually Glucose (mg/dL)   Date Value   06/23/2020 113 (H)       Cardiovascular Disease Screening     LDL Annually LDL Cholesterol (mg/dL)   Date Value   06/23/2020 105 HgbA1C  Annually GLYCOHEMOGLOBIN (HgA1c) (L) (%)   Date Value   03/11/2016 5.5     Glycohemoglobin (HgA1c) (%)   Date Value   03/08/2018 5.7    No flowsheet data found.     Creat/alb ratio  Annually      LDL  Annually LDL Cholesterol (mg/dL)   Date Value left lower extremity ([address]  See above, CT angiogram will be performed to rule out vascular obstruction causing his symptomatology of the left lower extremity      The patient indicates understanding of these issues and agrees to the plan.   The patient is a

## 2020-06-30 ENCOUNTER — TELEPHONE (OUTPATIENT)
Dept: INTERNAL MEDICINE CLINIC | Facility: CLINIC | Age: 75
End: 2020-06-30

## 2020-06-30 DIAGNOSIS — R73.9 ELEVATED BLOOD SUGAR: Primary | ICD-10-CM

## 2020-06-30 NOTE — TELEPHONE ENCOUNTER
Spoke to patient relayed MD message. Patient verbalized understanding and agrees with plan. Phone number to schedule provided.

## 2020-06-30 NOTE — TELEPHONE ENCOUNTER
Please call him, please give him the scheduling number at the hospital I have ordered a CT of the chest and a CT angiogram of the aorta and legs. Please have him call and schedule these tests and then I want blood tests 1 week later.     Tell him to mylene

## 2020-07-01 PROBLEM — I73.9 CLAUDICATION OF LEFT LOWER EXTREMITY: Status: ACTIVE | Noted: 2020-07-01

## 2020-07-01 PROBLEM — I73.9 CLAUDICATION OF LEFT LOWER EXTREMITY (HCC): Status: ACTIVE | Noted: 2020-07-01

## 2020-07-06 ENCOUNTER — HOSPITAL ENCOUNTER (OUTPATIENT)
Dept: CT IMAGING | Facility: HOSPITAL | Age: 75
Discharge: HOME | End: 2020-07-06
Attending: INTERNAL MEDICINE
Payer: MEDICARE

## 2020-07-06 DIAGNOSIS — I73.9 PVD (PERIPHERAL VASCULAR DISEASE) (HCC): ICD-10-CM

## 2020-07-06 PROCEDURE — 75635 CT ANGIO ABDOMINAL ARTERIES: CPT | Performed by: INTERNAL MEDICINE

## 2020-07-07 ENCOUNTER — TELEPHONE (OUTPATIENT)
Dept: INTERNAL MEDICINE CLINIC | Facility: CLINIC | Age: 75
End: 2020-07-07

## 2020-07-07 ENCOUNTER — OFFICE VISIT (OUTPATIENT)
Dept: INTERNAL MEDICINE CLINIC | Facility: CLINIC | Age: 75
End: 2020-07-07
Payer: COMMERCIAL

## 2020-07-07 VITALS
HEART RATE: 86 BPM | OXYGEN SATURATION: 98 % | BODY MASS INDEX: 28.2 KG/M2 | RESPIRATION RATE: 16 BRPM | SYSTOLIC BLOOD PRESSURE: 142 MMHG | TEMPERATURE: 99 F | DIASTOLIC BLOOD PRESSURE: 78 MMHG | HEIGHT: 70 IN | WEIGHT: 197 LBS

## 2020-07-07 DIAGNOSIS — R09.89 BILATERAL CAROTID BRUITS: ICD-10-CM

## 2020-07-07 DIAGNOSIS — I25.10 ARTERIOSCLEROTIC HEART DISEASE: Primary | ICD-10-CM

## 2020-07-07 DIAGNOSIS — I73.9 PVD (PERIPHERAL VASCULAR DISEASE) (HCC): ICD-10-CM

## 2020-07-07 DIAGNOSIS — I10 ESSENTIAL HYPERTENSION: ICD-10-CM

## 2020-07-07 DIAGNOSIS — I73.9 CLAUDICATION OF LEFT LOWER EXTREMITY (HCC): ICD-10-CM

## 2020-07-07 DIAGNOSIS — R91.1 NODULE OF APEX OF RIGHT LUNG: ICD-10-CM

## 2020-07-07 PROCEDURE — 99214 OFFICE O/P EST MOD 30 MIN: CPT | Performed by: INTERNAL MEDICINE

## 2020-07-07 PROCEDURE — G0463 HOSPITAL OUTPT CLINIC VISIT: HCPCS | Performed by: INTERNAL MEDICINE

## 2020-07-07 NOTE — TELEPHONE ENCOUNTER
Patient called and left message on home phone to call back as soon as he gets message. Pt called on cell phone with no answer and then goes to busy signal after multiple rings.  Dr Ulises Parnell would like patient to come in today 7/7/20 or tomorrow 7/8/20 for eval

## 2020-07-07 NOTE — PROGRESS NOTES
Raven Brenner is a 75year old male. HPI:   Elicia Davies has had symptoms compatible with vascular claudication on the left side which began last January when he noted discomfort in the left gluteus muscle hip and knee with physical exertion.   This has trinh occ    Drug use: No       REVIEW OF SYSTEMS:   GENERAL HEALTH: feels well otherwise  SKIN: denies any unusual skin lesions or rashes  RESPIRATORY: denies shortness of breath with exertion  CARDIOVASCULAR: denies chest pain on exertion  GI: denies abdominal Claudication of left lower extremity [facility])  Await evaluation by Dr. Henry Davenport    The patient indicates understanding of these issues and agrees to the plan. The patient is asked to return in 3 weeks.

## 2020-07-07 NOTE — TELEPHONE ENCOUNTER
Patient called back x2 with multiple rings then goes to busy signal. Patient will get covid nares testing via drive up and will get contacted by hospital to give him his appointment.

## 2020-07-07 NOTE — TELEPHONE ENCOUNTER
Pt. Returned call message was relayed that Dr. Tammy Hinds wants to see him for further evaluation pt. Expressed understanding appt.  Was made for today

## 2020-07-07 NOTE — TELEPHONE ENCOUNTER
Pt. Is calling to speak with Dr. Iván Sexton regarding his order for COVID19 ph.  # [phone]   Routed to clinical

## 2020-07-13 NOTE — PLAN OF CARE
· You are scheduled to have Lower Extremity Angiogram  · Do NOT eat or drink anything after Midnight  · Medications you are allowed to take can be taken with a sip of water. · Start Aspirin  Use Betasept/ Hibiclens soap for 3 consecutive days.        ARR

## 2020-07-15 ENCOUNTER — APPOINTMENT (OUTPATIENT)
Dept: LAB | Age: 75
End: 2020-07-15
Attending: INTERNAL MEDICINE
Payer: MEDICARE

## 2020-07-15 DIAGNOSIS — R73.9 ELEVATED BLOOD SUGAR: ICD-10-CM

## 2020-07-15 DIAGNOSIS — E11.8 TYPE 2 DIABETES MELLITUS WITH COMPLICATION, WITHOUT LONG-TERM CURRENT USE OF INSULIN (HCC): ICD-10-CM

## 2020-07-15 LAB
ANION GAP SERPL CALC-SCNC: 5 MMOL/L (ref 0–18)
BUN BLD-MCNC: 17 MG/DL (ref 7–18)
BUN/CREAT SERPL: 12.8 (ref 10–20)
CALCIUM BLD-MCNC: 9 MG/DL (ref 8.5–10.1)
CHLORIDE SERPL-SCNC: 102 MMOL/L (ref 98–112)
CO2 SERPL-SCNC: 29 MMOL/L (ref 21–32)
CREAT BLD-MCNC: 1.33 MG/DL (ref 0.7–1.3)
EST. AVERAGE GLUCOSE BLD GHB EST-MCNC: 128 MG/DL (ref 68–126)
GLUCOSE BLD-MCNC: 113 MG/DL (ref 70–99)
HBA1C MFR BLD HPLC: 6.1 % (ref ?–5.7)
OSMOLALITY SERPL CALC.SUM OF ELEC: 284 MOSM/KG (ref 275–295)
PATIENT FASTING Y/N/NP: YES
POTASSIUM SERPL-SCNC: 4.1 MMOL/L (ref 3.5–5.1)
SODIUM SERPL-SCNC: 136 MMOL/L (ref 136–145)

## 2020-07-15 PROCEDURE — 80048 BASIC METABOLIC PNL TOTAL CA: CPT

## 2020-07-15 PROCEDURE — 83036 HEMOGLOBIN GLYCOSYLATED A1C: CPT

## 2020-07-15 PROCEDURE — 36415 COLL VENOUS BLD VENIPUNCTURE: CPT

## 2020-07-23 ENCOUNTER — HOSPITAL ENCOUNTER (OUTPATIENT)
Dept: ULTRASOUND IMAGING | Facility: HOSPITAL | Age: 75
Discharge: HOME | End: 2020-07-23
Attending: INTERNAL MEDICINE
Payer: MEDICARE

## 2020-07-23 ENCOUNTER — HOSPITAL ENCOUNTER (OUTPATIENT)
Dept: CT IMAGING | Facility: HOSPITAL | Age: 75
Discharge: HOME | End: 2020-07-23
Attending: INTERNAL MEDICINE
Payer: MEDICARE

## 2020-07-23 ENCOUNTER — TELEPHONE (OUTPATIENT)
Dept: INTERNAL MEDICINE CLINIC | Facility: CLINIC | Age: 75
End: 2020-07-23

## 2020-07-23 DIAGNOSIS — I25.10 ARTERIOSCLEROTIC HEART DISEASE: ICD-10-CM

## 2020-07-23 DIAGNOSIS — R91.1 PULMONARY NODULE: ICD-10-CM

## 2020-07-23 DIAGNOSIS — R09.89 BILATERAL CAROTID BRUITS: ICD-10-CM

## 2020-07-23 PROCEDURE — 71250 CT THORAX DX C-: CPT | Performed by: INTERNAL MEDICINE

## 2020-07-23 PROCEDURE — 93880 EXTRACRANIAL BILAT STUDY: CPT | Performed by: INTERNAL MEDICINE

## 2020-07-23 NOTE — TELEPHONE ENCOUNTER
To Dr Kip Santana you please review results for Dr Anand Rosales patient. Ok to wait until his return on Monday? Patient had CT Chest and carotid u/s today.  Thank you

## 2020-07-28 ENCOUNTER — TELEPHONE (OUTPATIENT)
Dept: INTERNAL MEDICINE CLINIC | Facility: CLINIC | Age: 75
End: 2020-07-28

## 2020-07-29 ENCOUNTER — OFFICE VISIT (OUTPATIENT)
Dept: INTERNAL MEDICINE CLINIC | Facility: CLINIC | Age: 75
End: 2020-07-29
Payer: COMMERCIAL

## 2020-07-29 VITALS
BODY MASS INDEX: 28.2 KG/M2 | DIASTOLIC BLOOD PRESSURE: 66 MMHG | SYSTOLIC BLOOD PRESSURE: 150 MMHG | HEIGHT: 70 IN | HEART RATE: 80 BPM | WEIGHT: 197 LBS | TEMPERATURE: 99 F

## 2020-07-29 DIAGNOSIS — N18.30 CKD (CHRONIC KIDNEY DISEASE) STAGE 3, GFR 30-59 ML/MIN (HCC): Chronic | ICD-10-CM

## 2020-07-29 DIAGNOSIS — I10 ESSENTIAL HYPERTENSION: ICD-10-CM

## 2020-07-29 DIAGNOSIS — I73.9 PVD (PERIPHERAL VASCULAR DISEASE) (HCC): ICD-10-CM

## 2020-07-29 DIAGNOSIS — I73.9 CLAUDICATION OF LEFT LOWER EXTREMITY (HCC): Primary | ICD-10-CM

## 2020-07-29 DIAGNOSIS — E78.00 HYPERCHOLESTEREMIA: ICD-10-CM

## 2020-07-29 PROCEDURE — 3077F SYST BP >= 140 MM HG: CPT | Performed by: INTERNAL MEDICINE

## 2020-07-29 PROCEDURE — 3008F BODY MASS INDEX DOCD: CPT | Performed by: INTERNAL MEDICINE

## 2020-07-29 PROCEDURE — 3078F DIAST BP <80 MM HG: CPT | Performed by: INTERNAL MEDICINE

## 2020-07-29 PROCEDURE — 99214 OFFICE O/P EST MOD 30 MIN: CPT | Performed by: INTERNAL MEDICINE

## 2020-07-29 PROCEDURE — G0463 HOSPITAL OUTPT CLINIC VISIT: HCPCS | Performed by: INTERNAL MEDICINE

## 2020-07-29 RX ORDER — ATORVASTATIN CALCIUM 20 MG/1
20 TABLET, FILM COATED ORAL NIGHTLY
Qty: 90 TABLET | Refills: 3 | Status: SHIPPED | OUTPATIENT
Start: 2020-07-29 | End: 2021-09-17

## 2020-07-29 NOTE — PROGRESS NOTES
Marcos Morataya is a 75year old male. HPI:   He is slated to have stenting of the ileo-femoral a on the left Dr Lianet Salguero. Claudication continues. He is otherwise feeling well.      I did US or carotids and they are normal.     CT chest - stable Rt apic lesions  HEENT: atraumatic, normocephalic,ears and throat are clear  NECK: supple,no adenopathy,no bruits  LUNGS: clear to auscultation  CARDIO: RRR without murmur  GI: good BS's,no masses, HSM or tenderness  EXTREMITIES: no cyanosis, clubbing or edema

## 2020-08-01 ENCOUNTER — LAB ENCOUNTER (OUTPATIENT)
Dept: LAB | Facility: HOSPITAL | Age: 75
End: 2020-08-01
Attending: RADIOLOGY
Payer: MEDICARE

## 2020-08-01 DIAGNOSIS — Z01.818 PRE-OP EXAM: ICD-10-CM

## 2020-08-02 LAB — SARS-COV-2 RNA RESP QL NAA+PROBE: NOT DETECTED

## 2020-08-03 RX ORDER — SODIUM CHLORIDE 9 MG/ML
INJECTION, SOLUTION INTRAVENOUS CONTINUOUS
Status: CANCELLED | OUTPATIENT
Start: 2020-08-03

## 2020-08-04 ENCOUNTER — HOSPITAL ENCOUNTER (OUTPATIENT)
Dept: INTERVENTIONAL RADIOLOGY/VASCULAR | Facility: HOSPITAL | Age: 75
Discharge: HOME | End: 2020-08-04
Attending: RADIOLOGY | Admitting: RADIOLOGY
Payer: MEDICARE

## 2020-08-04 VITALS
SYSTOLIC BLOOD PRESSURE: 117 MMHG | HEART RATE: 63 BPM | DIASTOLIC BLOOD PRESSURE: 59 MMHG | RESPIRATION RATE: 17 BRPM | BODY MASS INDEX: 28 KG/M2 | OXYGEN SATURATION: 96 % | WEIGHT: 197.06 LBS

## 2020-08-04 DIAGNOSIS — Z01.818 PRE-OP EXAM: Primary | ICD-10-CM

## 2020-08-04 DIAGNOSIS — I73.9 PAD (PERIPHERAL ARTERY DISEASE) (HCC): ICD-10-CM

## 2020-08-04 LAB — GLUCOSE BLDC GLUCOMTR-MCNC: 127 MG/DL (ref 70–99)

## 2020-08-04 PROCEDURE — 047D3DZ DILATION OF LEFT COMMON ILIAC ARTERY WITH INTRALUMINAL DEVICE, PERCUTANEOUS APPROACH: ICD-10-PCS | Performed by: RADIOLOGY

## 2020-08-04 PROCEDURE — B41F1ZZ FLUOROSCOPY OF RIGHT LOWER EXTREMITY ARTERIES USING LOW OSMOLAR CONTRAST: ICD-10-PCS | Performed by: RADIOLOGY

## 2020-08-04 PROCEDURE — 047C3DZ DILATION OF RIGHT COMMON ILIAC ARTERY WITH INTRALUMINAL DEVICE, PERCUTANEOUS APPROACH: ICD-10-PCS | Performed by: RADIOLOGY

## 2020-08-04 PROCEDURE — 75710 ARTERY X-RAYS ARM/LEG: CPT

## 2020-08-04 PROCEDURE — 37221 HC TRANSLUMINAL ANGIOPLASTY W STENT ILIAC UNILAT INIT: CPT

## 2020-08-04 PROCEDURE — 82962 GLUCOSE BLOOD TEST: CPT

## 2020-08-04 PROCEDURE — 36415 COLL VENOUS BLD VENIPUNCTURE: CPT

## 2020-08-04 PROCEDURE — B41G1ZZ FLUOROSCOPY OF LEFT LOWER EXTREMITY ARTERIES USING LOW OSMOLAR CONTRAST: ICD-10-PCS | Performed by: RADIOLOGY

## 2020-08-04 PROCEDURE — 99152 MOD SED SAME PHYS/QHP 5/>YRS: CPT

## 2020-08-04 PROCEDURE — 75716 ARTERY X-RAYS ARMS/LEGS: CPT

## 2020-08-04 PROCEDURE — 99153 MOD SED SAME PHYS/QHP EA: CPT

## 2020-08-04 RX ORDER — SODIUM CHLORIDE 9 MG/ML
INJECTION, SOLUTION INTRAVENOUS CONTINUOUS
Status: DISCONTINUED | OUTPATIENT
Start: 2020-08-04 | End: 2020-08-04

## 2020-08-04 RX ORDER — ASPIRIN 81 MG/1
81 TABLET ORAL DAILY
Status: DISCONTINUED | OUTPATIENT
Start: 2020-08-05 | End: 2020-08-04

## 2020-08-04 RX ORDER — CLOPIDOGREL BISULFATE 75 MG/1
75 TABLET ORAL DAILY
Qty: 30 TABLET | Refills: 0 | Status: SHIPPED | OUTPATIENT
Start: 2020-08-05 | End: 2020-09-01

## 2020-08-04 RX ORDER — CLOPIDOGREL BISULFATE 75 MG/1
300 TABLET ORAL ONCE
Status: COMPLETED | OUTPATIENT
Start: 2020-08-04 | End: 2020-08-04

## 2020-08-04 RX ORDER — HEPARIN SODIUM 1000 [USP'U]/ML
INJECTION, SOLUTION INTRAVENOUS; SUBCUTANEOUS
Status: COMPLETED
Start: 2020-08-04 | End: 2020-08-04

## 2020-08-04 RX ORDER — CLOPIDOGREL BISULFATE 75 MG/1
TABLET ORAL
Status: COMPLETED
Start: 2020-08-04 | End: 2020-08-04

## 2020-08-04 RX ORDER — MIDAZOLAM HYDROCHLORIDE 1 MG/ML
INJECTION INTRAMUSCULAR; INTRAVENOUS
Status: COMPLETED
Start: 2020-08-04 | End: 2020-08-04

## 2020-08-04 RX ORDER — SODIUM CHLORIDE 0.9 % (FLUSH) 0.9 %
10 SYRINGE (ML) INJECTION AS NEEDED
Status: CANCELLED | OUTPATIENT
Start: 2020-08-04

## 2020-08-04 RX ORDER — LIDOCAINE HYDROCHLORIDE 20 MG/ML
INJECTION, SOLUTION EPIDURAL; INFILTRATION; INTRACAUDAL; PERINEURAL
Status: COMPLETED
Start: 2020-08-04 | End: 2020-08-04

## 2020-08-04 RX ADMIN — SODIUM CHLORIDE: 9 INJECTION, SOLUTION INTRAVENOUS at 06:30:00

## 2020-08-04 RX ADMIN — CLOPIDOGREL BISULFATE 300 MG: 75 TABLET ORAL at 10:15:00

## 2020-08-04 NOTE — IVS NOTE
Pt was able to eat and drink, no nausea or vomiting. Pt ambulated after bedrest, sites remained  Soft, no bleeding and no hematoma. Discharge instruction was given to pt and wife.   Follow-up appt was made with Arelis Cantrell [facility] as ordered by Dr. Monae Serra

## 2020-08-06 NOTE — INTERVAL H&P NOTE
The above referenced H&P was reviewed by Cleve Conrad MD on 8/6/2020, the patient was examined and no significant changes have occurred in the patient's condition since the H&P was performed.   Risks, benefits, alternative treatments and consequences of no

## 2020-08-24 ENCOUNTER — TELEPHONE (OUTPATIENT)
Dept: INTERNAL MEDICINE CLINIC | Facility: CLINIC | Age: 75
End: 2020-08-24

## 2020-08-24 NOTE — TELEPHONE ENCOUNTER
Patient will be scheduling nuclear stress test for 8/31 & referral expires on 8/29  Will need to get another referral & how long will it take?     Please call Km Flores in central scheduling to advise    [phone]

## 2020-08-26 ENCOUNTER — TELEPHONE (OUTPATIENT)
Dept: INTERNAL MEDICINE CLINIC | Facility: CLINIC | Age: 75
End: 2020-08-26

## 2020-08-26 DIAGNOSIS — N28.9 RENAL INSUFFICIENCY: Primary | ICD-10-CM

## 2020-08-26 NOTE — TELEPHONE ENCOUNTER
Pt notified that he can proceed with stress test with current meds     BMP ordered / DR FATIMA to recheck kidneys  Dx - renal insufficiency   Will do before next appt on 9-29

## 2020-08-26 NOTE — TELEPHONE ENCOUNTER
Good Morning,    Authorization has been extended to 10/10/2020 .  Patient aware    Megan Maldonado-Referral Specialist

## 2020-08-26 NOTE — TELEPHONE ENCOUNTER
He can proceed with stress test on current medication. Please order BMP to recheck his kidneys.   He can do that at the same time if he likes or at any other time diagnosis renal insufficiency

## 2020-08-26 NOTE — TELEPHONE ENCOUNTER
Pt scheduled for nuclear stress on Tuesday, 9/1/20  Please advise pt if there is medication he should stop before that test?  Pt has new medication since stent  Tasked to nursing

## 2020-08-29 ENCOUNTER — LAB ENCOUNTER (OUTPATIENT)
Dept: LAB | Facility: HOSPITAL | Age: 75
End: 2020-08-29
Attending: INTERNAL MEDICINE
Payer: MEDICARE

## 2020-08-29 DIAGNOSIS — Z01.812 PRE-PROCEDURE LAB EXAM: Primary | ICD-10-CM

## 2020-08-30 LAB — SARS-COV-2 RNA RESP QL NAA+PROBE: NOT DETECTED

## 2020-09-01 ENCOUNTER — HOSPITAL ENCOUNTER (OUTPATIENT)
Dept: CV DIAGNOSTICS | Facility: HOSPITAL | Age: 75
Discharge: HOME | End: 2020-09-01
Attending: INTERNAL MEDICINE
Payer: MEDICARE

## 2020-09-01 ENCOUNTER — HOSPITAL ENCOUNTER (OUTPATIENT)
Dept: NUCLEAR MEDICINE | Facility: HOSPITAL | Age: 75
Discharge: HOME | End: 2020-09-01
Attending: INTERNAL MEDICINE
Payer: MEDICARE

## 2020-09-01 ENCOUNTER — TELEPHONE (OUTPATIENT)
Dept: INTERNAL MEDICINE CLINIC | Facility: CLINIC | Age: 75
End: 2020-09-01

## 2020-09-01 DIAGNOSIS — R09.89 BILATERAL CAROTID BRUITS: ICD-10-CM

## 2020-09-01 DIAGNOSIS — I25.10 ARTERIOSCLEROTIC HEART DISEASE: ICD-10-CM

## 2020-09-01 PROCEDURE — 78452 HT MUSCLE IMAGE SPECT MULT: CPT | Performed by: INTERNAL MEDICINE

## 2020-09-01 PROCEDURE — 93016 CV STRESS TEST SUPVJ ONLY: CPT | Performed by: INTERNAL MEDICINE

## 2020-09-01 PROCEDURE — 93017 CV STRESS TEST TRACING ONLY: CPT | Performed by: INTERNAL MEDICINE

## 2020-09-01 PROCEDURE — 93018 CV STRESS TEST I&R ONLY: CPT | Performed by: INTERNAL MEDICINE

## 2020-09-01 RX ORDER — CLOPIDOGREL BISULFATE 75 MG/1
TABLET ORAL
Qty: 30 TABLET | Refills: 0 | Status: SHIPPED | OUTPATIENT
Start: 2020-09-01 | End: 2020-09-29 | Stop reason: ALTCHOICE

## 2020-09-01 NOTE — TELEPHONE ENCOUNTER
PRABHAKAR to Dr. Bridget Gimenez--    Spoke to pt and advised on MD message below; pt verbalized understanding. He reports his legs are feeling great and expresses gratitude for referring him to Dr. Madeleine Hodge.

## 2020-09-01 NOTE — TELEPHONE ENCOUNTER
Nuclear stress test was absolutely normal.  How are his legs doing following the stenting procedure?

## 2020-09-21 ENCOUNTER — LAB ENCOUNTER (OUTPATIENT)
Dept: LAB | Facility: HOSPITAL | Age: 75
End: 2020-09-21
Attending: INTERNAL MEDICINE
Payer: MEDICARE

## 2020-09-21 DIAGNOSIS — N28.9 RENAL INSUFFICIENCY: ICD-10-CM

## 2020-09-21 LAB
ANION GAP SERPL CALC-SCNC: 5 MMOL/L (ref 0–18)
BUN BLD-MCNC: 16 MG/DL (ref 7–18)
BUN/CREAT SERPL: 12.7 (ref 10–20)
CALCIUM BLD-MCNC: 9.1 MG/DL (ref 8.5–10.1)
CHLORIDE SERPL-SCNC: 107 MMOL/L (ref 98–112)
CO2 SERPL-SCNC: 30 MMOL/L (ref 21–32)
CREAT BLD-MCNC: 1.26 MG/DL
GLUCOSE BLD-MCNC: 107 MG/DL (ref 70–99)
OSMOLALITY SERPL CALC.SUM OF ELEC: 296 MOSM/KG (ref 275–295)
PATIENT FASTING Y/N/NP: YES
POTASSIUM SERPL-SCNC: 4.3 MMOL/L (ref 3.5–5.1)
SODIUM SERPL-SCNC: 142 MMOL/L (ref 136–145)

## 2020-09-21 PROCEDURE — 80048 BASIC METABOLIC PNL TOTAL CA: CPT

## 2020-09-21 PROCEDURE — 36415 COLL VENOUS BLD VENIPUNCTURE: CPT

## 2020-09-22 ENCOUNTER — TELEPHONE (OUTPATIENT)
Dept: INTERNAL MEDICINE CLINIC | Facility: CLINIC | Age: 75
End: 2020-09-22

## 2020-09-29 ENCOUNTER — OFFICE VISIT (OUTPATIENT)
Dept: INTERNAL MEDICINE CLINIC | Facility: CLINIC | Age: 75
End: 2020-09-29
Payer: COMMERCIAL

## 2020-09-29 VITALS
TEMPERATURE: 97 F | SYSTOLIC BLOOD PRESSURE: 144 MMHG | HEART RATE: 64 BPM | BODY MASS INDEX: 27.35 KG/M2 | HEIGHT: 70 IN | OXYGEN SATURATION: 99 % | DIASTOLIC BLOOD PRESSURE: 84 MMHG | WEIGHT: 191 LBS

## 2020-09-29 DIAGNOSIS — E11.8 TYPE 2 DIABETES MELLITUS WITH COMPLICATION, WITHOUT LONG-TERM CURRENT USE OF INSULIN (HCC): ICD-10-CM

## 2020-09-29 DIAGNOSIS — E78.00 HYPERCHOLESTEREMIA: ICD-10-CM

## 2020-09-29 DIAGNOSIS — Z23 NEED FOR VACCINATION: ICD-10-CM

## 2020-09-29 DIAGNOSIS — N18.30 CKD (CHRONIC KIDNEY DISEASE) STAGE 3, GFR 30-59 ML/MIN (HCC): Chronic | ICD-10-CM

## 2020-09-29 DIAGNOSIS — I73.9 PVD (PERIPHERAL VASCULAR DISEASE) (HCC): Primary | ICD-10-CM

## 2020-09-29 DIAGNOSIS — I10 ESSENTIAL HYPERTENSION: ICD-10-CM

## 2020-09-29 PROBLEM — J41.0 SMOKERS' COUGH (HCC): Chronic | Status: ACTIVE | Noted: 2020-09-29

## 2020-09-29 PROCEDURE — 3077F SYST BP >= 140 MM HG: CPT | Performed by: INTERNAL MEDICINE

## 2020-09-29 PROCEDURE — 99214 OFFICE O/P EST MOD 30 MIN: CPT | Performed by: INTERNAL MEDICINE

## 2020-09-29 PROCEDURE — 3079F DIAST BP 80-89 MM HG: CPT | Performed by: INTERNAL MEDICINE

## 2020-09-29 PROCEDURE — G0463 HOSPITAL OUTPT CLINIC VISIT: HCPCS | Performed by: INTERNAL MEDICINE

## 2020-09-29 PROCEDURE — 3008F BODY MASS INDEX DOCD: CPT | Performed by: INTERNAL MEDICINE

## 2020-09-29 PROCEDURE — 90662 IIV NO PRSV INCREASED AG IM: CPT | Performed by: INTERNAL MEDICINE

## 2020-09-29 PROCEDURE — G0008 ADMIN INFLUENZA VIRUS VAC: HCPCS | Performed by: INTERNAL MEDICINE

## 2020-09-29 NOTE — PROGRESS NOTES
Mary Aldana is a 67year old male. HPI:   He had stenting of both legs Dr Paula Vogel 8/4/20 -  He is doing great - he can now go on the treadmill 30 min and cut the grass without sx. No chest pain or sob.     HTN    Hypercholesterolemia -     B °F (36.3 °C)   Ht 5' 10\" (1.778 m)   Wt 191 lb (86.6 kg)   SpO2 99%   BMI 27.41 kg/m²   GENERAL: well developed, well nourished,in no apparent distress  SKIN: no rashes,no suspicious lesions  HEENT: atraumatic, normocephalic,ears and throat are clear  NEC

## 2020-12-09 ENCOUNTER — LAB ENCOUNTER (OUTPATIENT)
Dept: LAB | Facility: HOSPITAL | Age: 75
End: 2020-12-09
Attending: INTERNAL MEDICINE
Payer: MEDICARE

## 2020-12-09 DIAGNOSIS — E11.8 TYPE 2 DIABETES MELLITUS WITH COMPLICATION, WITHOUT LONG-TERM CURRENT USE OF INSULIN (HCC): ICD-10-CM

## 2020-12-09 DIAGNOSIS — I10 ESSENTIAL HYPERTENSION: ICD-10-CM

## 2020-12-09 DIAGNOSIS — E78.00 HYPERCHOLESTEREMIA: ICD-10-CM

## 2020-12-09 PROCEDURE — 85025 COMPLETE CBC W/AUTO DIFF WBC: CPT

## 2020-12-09 PROCEDURE — 80053 COMPREHEN METABOLIC PANEL: CPT

## 2020-12-09 PROCEDURE — 83036 HEMOGLOBIN GLYCOSYLATED A1C: CPT

## 2020-12-09 PROCEDURE — 36415 COLL VENOUS BLD VENIPUNCTURE: CPT

## 2020-12-09 PROCEDURE — 80061 LIPID PANEL: CPT

## 2020-12-15 ENCOUNTER — OFFICE VISIT (OUTPATIENT)
Dept: INTERNAL MEDICINE CLINIC | Facility: CLINIC | Age: 75
End: 2020-12-15
Payer: COMMERCIAL

## 2020-12-15 VITALS
HEIGHT: 70 IN | TEMPERATURE: 98 F | DIASTOLIC BLOOD PRESSURE: 72 MMHG | WEIGHT: 193 LBS | SYSTOLIC BLOOD PRESSURE: 160 MMHG | HEART RATE: 67 BPM | BODY MASS INDEX: 27.63 KG/M2 | OXYGEN SATURATION: 100 %

## 2020-12-15 DIAGNOSIS — E78.00 HYPERCHOLESTEREMIA: ICD-10-CM

## 2020-12-15 DIAGNOSIS — Z12.5 PROSTATE CANCER SCREENING: ICD-10-CM

## 2020-12-15 DIAGNOSIS — R91.1 NODULE OF APEX OF RIGHT LUNG: ICD-10-CM

## 2020-12-15 DIAGNOSIS — I73.9 PVD (PERIPHERAL VASCULAR DISEASE) (HCC): Primary | ICD-10-CM

## 2020-12-15 DIAGNOSIS — E55.9 VITAMIN D DEFICIENCY: ICD-10-CM

## 2020-12-15 DIAGNOSIS — I10 ESSENTIAL HYPERTENSION: ICD-10-CM

## 2020-12-15 PROCEDURE — 99214 OFFICE O/P EST MOD 30 MIN: CPT | Performed by: INTERNAL MEDICINE

## 2020-12-15 PROCEDURE — 3077F SYST BP >= 140 MM HG: CPT | Performed by: INTERNAL MEDICINE

## 2020-12-15 PROCEDURE — 3008F BODY MASS INDEX DOCD: CPT | Performed by: INTERNAL MEDICINE

## 2020-12-15 PROCEDURE — G0463 HOSPITAL OUTPT CLINIC VISIT: HCPCS | Performed by: INTERNAL MEDICINE

## 2020-12-15 PROCEDURE — 3078F DIAST BP <80 MM HG: CPT | Performed by: INTERNAL MEDICINE

## 2020-12-15 NOTE — PROGRESS NOTES
Lopez Fonseca is a 67year old male. HPI:   He has been doing well.     No recurrence of claudication since angioplasty     HTN    Hypercholesterolemia - LDL 40    DM - A1C down from 6.1 to 5.8    Father  age 43 cancer of stomach  Mother  age °C)   Ht 5' 10\" (1.778 m)   Wt 193 lb (87.5 kg)   SpO2 100%   BMI 27.69 kg/m²   GENERAL: well developed, well nourished,in no apparent distress  SKIN: no rashes,no suspicious lesions  HEENT: atraumatic, normocephalic,ears and throat are clear  NECK: suppl

## 2021-02-01 DIAGNOSIS — Z23 NEED FOR VACCINATION: ICD-10-CM

## 2021-02-08 ENCOUNTER — IMMUNIZATION (OUTPATIENT)
Dept: LAB | Facility: HOSPITAL | Age: 76
End: 2021-02-08
Attending: HOSPITALIST
Payer: MEDICARE

## 2021-02-08 DIAGNOSIS — Z23 NEED FOR VACCINATION: Primary | ICD-10-CM

## 2021-02-08 PROCEDURE — 0011A SARSCOV2 VAC 100MCG/0.5ML IM: CPT

## 2021-03-09 ENCOUNTER — IMMUNIZATION (OUTPATIENT)
Dept: LAB | Facility: HOSPITAL | Age: 76
End: 2021-03-09
Attending: EMERGENCY MEDICINE
Payer: MEDICARE

## 2021-03-09 DIAGNOSIS — Z23 NEED FOR VACCINATION: Primary | ICD-10-CM

## 2021-03-09 PROCEDURE — 0012A SARSCOV2 VAC 100MCG/0.5ML IM: CPT

## 2021-04-21 RX ORDER — ENALAPRIL MALEATE 10 MG/1
10 TABLET ORAL DAILY
Qty: 90 TABLET | Refills: 0 | Status: SHIPPED | OUTPATIENT
Start: 2021-04-21 | End: 2021-07-29

## 2021-06-07 ENCOUNTER — LAB ENCOUNTER (OUTPATIENT)
Dept: LAB | Facility: HOSPITAL | Age: 76
End: 2021-06-07
Attending: INTERNAL MEDICINE
Payer: MEDICARE

## 2021-06-07 DIAGNOSIS — E55.9 VITAMIN D DEFICIENCY: ICD-10-CM

## 2021-06-07 DIAGNOSIS — Z12.5 PROSTATE CANCER SCREENING: ICD-10-CM

## 2021-06-07 DIAGNOSIS — E78.00 HYPERCHOLESTEREMIA: ICD-10-CM

## 2021-06-07 PROCEDURE — 36415 COLL VENOUS BLD VENIPUNCTURE: CPT

## 2021-06-07 PROCEDURE — 81015 MICROSCOPIC EXAM OF URINE: CPT

## 2021-06-07 PROCEDURE — 82306 VITAMIN D 25 HYDROXY: CPT

## 2021-06-07 PROCEDURE — 80053 COMPREHEN METABOLIC PANEL: CPT

## 2021-06-07 PROCEDURE — 81001 URINALYSIS AUTO W/SCOPE: CPT

## 2021-06-07 PROCEDURE — 80061 LIPID PANEL: CPT

## 2021-06-07 PROCEDURE — 85025 COMPLETE CBC W/AUTO DIFF WBC: CPT

## 2021-06-07 PROCEDURE — 84443 ASSAY THYROID STIM HORMONE: CPT

## 2021-06-07 PROCEDURE — 83036 HEMOGLOBIN GLYCOSYLATED A1C: CPT

## 2021-06-15 ENCOUNTER — OFFICE VISIT (OUTPATIENT)
Dept: INTERNAL MEDICINE CLINIC | Facility: CLINIC | Age: 76
End: 2021-06-15
Payer: COMMERCIAL

## 2021-06-15 VITALS
HEART RATE: 75 BPM | WEIGHT: 195.38 LBS | BODY MASS INDEX: 27.97 KG/M2 | OXYGEN SATURATION: 99 % | HEIGHT: 70 IN | DIASTOLIC BLOOD PRESSURE: 66 MMHG | SYSTOLIC BLOOD PRESSURE: 108 MMHG | TEMPERATURE: 98 F

## 2021-06-15 DIAGNOSIS — E11.8 TYPE 2 DIABETES MELLITUS WITH COMPLICATION, WITHOUT LONG-TERM CURRENT USE OF INSULIN (HCC): ICD-10-CM

## 2021-06-15 DIAGNOSIS — R91.1 NODULE OF APEX OF RIGHT LUNG: ICD-10-CM

## 2021-06-15 DIAGNOSIS — I10 ESSENTIAL HYPERTENSION: ICD-10-CM

## 2021-06-15 DIAGNOSIS — Z00.00 ENCOUNTER FOR MEDICARE ANNUAL WELLNESS EXAM: Primary | ICD-10-CM

## 2021-06-15 DIAGNOSIS — Z87.891 SMOKING HISTORY: ICD-10-CM

## 2021-06-15 DIAGNOSIS — E78.00 HYPERCHOLESTEREMIA: ICD-10-CM

## 2021-06-15 DIAGNOSIS — I73.9 PVD (PERIPHERAL VASCULAR DISEASE) (HCC): ICD-10-CM

## 2021-06-15 PROCEDURE — 96160 PT-FOCUSED HLTH RISK ASSMT: CPT | Performed by: INTERNAL MEDICINE

## 2021-06-15 PROCEDURE — 3078F DIAST BP <80 MM HG: CPT | Performed by: INTERNAL MEDICINE

## 2021-06-15 PROCEDURE — 3008F BODY MASS INDEX DOCD: CPT | Performed by: INTERNAL MEDICINE

## 2021-06-15 PROCEDURE — 3074F SYST BP LT 130 MM HG: CPT | Performed by: INTERNAL MEDICINE

## 2021-06-15 PROCEDURE — 99397 PER PM REEVAL EST PAT 65+ YR: CPT | Performed by: INTERNAL MEDICINE

## 2021-06-15 PROCEDURE — G0439 PPPS, SUBSEQ VISIT: HCPCS | Performed by: INTERNAL MEDICINE

## 2021-07-15 ENCOUNTER — HOSPITAL ENCOUNTER (OUTPATIENT)
Dept: CT IMAGING | Facility: HOSPITAL | Age: 76
Discharge: HOME | End: 2021-07-15
Attending: INTERNAL MEDICINE
Payer: MEDICARE

## 2021-07-15 DIAGNOSIS — Z87.891 SMOKING HISTORY: ICD-10-CM

## 2021-07-15 PROCEDURE — 71271 CT THORAX LUNG CANCER SCR C-: CPT | Performed by: INTERNAL MEDICINE

## 2021-07-25 ENCOUNTER — TELEPHONE (OUTPATIENT)
Dept: INTERNAL MEDICINE CLINIC | Facility: CLINIC | Age: 76
End: 2021-07-25

## 2021-07-30 RX ORDER — ENALAPRIL MALEATE 10 MG/1
10 TABLET ORAL DAILY
Qty: 90 TABLET | Refills: 3 | Status: SHIPPED | OUTPATIENT
Start: 2021-07-30

## 2021-09-17 ENCOUNTER — TELEPHONE (OUTPATIENT)
Dept: INTERNAL MEDICINE CLINIC | Facility: CLINIC | Age: 76
End: 2021-09-17

## 2021-09-17 RX ORDER — ATORVASTATIN CALCIUM 20 MG/1
20 TABLET, FILM COATED ORAL NIGHTLY
Qty: 90 TABLET | Refills: 3 | Status: SHIPPED | OUTPATIENT
Start: 2021-09-17

## 2021-09-17 RX ORDER — ASPIRIN 81 MG/1
81 TABLET ORAL DAILY
COMMUNITY

## 2021-09-17 NOTE — TELEPHONE ENCOUNTER
Reached patient for medication adherence consult. Patient is slightly past due for refill on atorvastatin; last filled 6/1/21 for 90 day supply. He does tell me he is getting low on this medication and is out of refills.  Will pend new script for PCP re

## 2021-10-19 ENCOUNTER — IMMUNIZATION (OUTPATIENT)
Dept: INTERNAL MEDICINE CLINIC | Facility: CLINIC | Age: 76
End: 2021-10-19
Payer: COMMERCIAL

## 2021-10-19 DIAGNOSIS — Z23 NEED FOR VACCINATION: Primary | ICD-10-CM

## 2021-10-19 PROCEDURE — 90662 IIV NO PRSV INCREASED AG IM: CPT | Performed by: INTERNAL MEDICINE

## 2021-10-19 PROCEDURE — G0008 ADMIN INFLUENZA VIRUS VAC: HCPCS | Performed by: INTERNAL MEDICINE

## 2021-10-27 ENCOUNTER — IMMUNIZATION (OUTPATIENT)
Dept: LAB | Facility: HOSPITAL | Age: 76
End: 2021-10-27
Attending: EMERGENCY MEDICINE
Payer: MEDICARE

## 2021-10-27 DIAGNOSIS — Z23 NEED FOR VACCINATION: Primary | ICD-10-CM

## 2021-10-27 PROCEDURE — 0064A SARSCOV2 VAC 50MCG/0.25ML IM: CPT

## 2021-12-08 ENCOUNTER — LAB ENCOUNTER (OUTPATIENT)
Dept: LAB | Facility: HOSPITAL | Age: 76
End: 2021-12-08
Attending: INTERNAL MEDICINE
Payer: MEDICARE

## 2021-12-08 DIAGNOSIS — E78.00 HYPERCHOLESTEREMIA: ICD-10-CM

## 2021-12-08 DIAGNOSIS — I10 ESSENTIAL HYPERTENSION: ICD-10-CM

## 2021-12-08 PROCEDURE — 83036 HEMOGLOBIN GLYCOSYLATED A1C: CPT

## 2021-12-08 PROCEDURE — 80061 LIPID PANEL: CPT

## 2021-12-08 PROCEDURE — 36415 COLL VENOUS BLD VENIPUNCTURE: CPT

## 2021-12-08 PROCEDURE — 85025 COMPLETE CBC W/AUTO DIFF WBC: CPT

## 2021-12-08 PROCEDURE — 80053 COMPREHEN METABOLIC PANEL: CPT

## 2021-12-15 ENCOUNTER — OFFICE VISIT (OUTPATIENT)
Dept: INTERNAL MEDICINE CLINIC | Facility: CLINIC | Age: 76
End: 2021-12-15
Payer: COMMERCIAL

## 2021-12-15 VITALS
HEIGHT: 70 IN | TEMPERATURE: 98 F | SYSTOLIC BLOOD PRESSURE: 144 MMHG | HEART RATE: 68 BPM | BODY MASS INDEX: 27.35 KG/M2 | DIASTOLIC BLOOD PRESSURE: 70 MMHG | OXYGEN SATURATION: 98 % | WEIGHT: 191 LBS

## 2021-12-15 DIAGNOSIS — M75.51 BURSITIS OF RIGHT SHOULDER: ICD-10-CM

## 2021-12-15 DIAGNOSIS — I10 ESSENTIAL HYPERTENSION: ICD-10-CM

## 2021-12-15 DIAGNOSIS — E78.00 HYPERCHOLESTEREMIA: Primary | ICD-10-CM

## 2021-12-15 DIAGNOSIS — I73.9 PVD (PERIPHERAL VASCULAR DISEASE) (HCC): ICD-10-CM

## 2021-12-15 PROBLEM — M25.511 ACUTE PAIN OF RIGHT SHOULDER: Status: ACTIVE | Noted: 2021-12-15

## 2021-12-15 PROCEDURE — 99214 OFFICE O/P EST MOD 30 MIN: CPT | Performed by: INTERNAL MEDICINE

## 2021-12-15 PROCEDURE — 3077F SYST BP >= 140 MM HG: CPT | Performed by: INTERNAL MEDICINE

## 2021-12-15 PROCEDURE — 3078F DIAST BP <80 MM HG: CPT | Performed by: INTERNAL MEDICINE

## 2021-12-15 PROCEDURE — 3008F BODY MASS INDEX DOCD: CPT | Performed by: INTERNAL MEDICINE

## 2021-12-15 PROCEDURE — 20610 DRAIN/INJ JOINT/BURSA W/O US: CPT | Performed by: INTERNAL MEDICINE

## 2021-12-15 RX ORDER — TRIAMCINOLONE ACETONIDE 40 MG/ML
40 INJECTION, SUSPENSION INTRA-ARTICULAR; INTRAMUSCULAR ONCE
Status: COMPLETED | OUTPATIENT
Start: 2021-12-15 | End: 2021-12-15

## 2021-12-15 RX ADMIN — TRIAMCINOLONE ACETONIDE 40 MG: 40 INJECTION, SUSPENSION INTRA-ARTICULAR; INTRAMUSCULAR at 12:04:00

## 2021-12-15 NOTE — PROGRESS NOTES
Raven Brenner is a 67year old male. HPI:   He is here for check up. He is feeling well. He has had pain in the right shoulder x 6 weeks.   No fall or injury     DM - A1C 5.8     HTN    Hypercholesterolemia       SR: no chest pain or sob, no gu exertion  GI: denies abdominal pain and denies heartburn  NEURO: denies headaches    EXAM:   /70   Pulse 68   Temp 98 °F (36.7 °C)   Ht 5' 10\" (1.778 m)   Wt 191 lb (86.6 kg)   SpO2 98%   BMI 27.41 kg/m²   GENERAL: well developed, well nourished,in

## 2022-01-04 ENCOUNTER — TELEPHONE (OUTPATIENT)
Dept: INTERNAL MEDICINE CLINIC | Facility: CLINIC | Age: 77
End: 2022-01-04

## 2022-01-04 DIAGNOSIS — Z20.822 EXPOSURE TO COVID-19 VIRUS: Primary | ICD-10-CM

## 2022-01-04 DIAGNOSIS — R05.9 COUGH: ICD-10-CM

## 2022-01-04 NOTE — TELEPHONE ENCOUNTER
Pt is calling and states that he has a head cold. Feeling chest and nose  congestion , runny nose and  headache no fever. This started on 12/30/2021.   Pt states they have not been around anyone that he is aware of who has covid    Pt wife Radu has the s

## 2022-01-04 NOTE — TELEPHONE ENCOUNTER
Respiratory infection triage:    Fever:  [x]  No fever  []  Fever>100.4    Cough:  [] Tight cough  [] Cough with exertion  [x] Dry cough intermittent, sometimes productive; mostly clear  [] Sputum production, Color:     Breathing:  [] Mild shortness of fran

## 2022-01-04 NOTE — TELEPHONE ENCOUNTER
Notified patient of Dr. Selena Maki message. Patient verbalized understanding and agrees to plan. Patient states his call was dropped when he was transferred to [facility].  He states he was told by Central Scheduling earlier that they are having tro

## 2022-01-04 NOTE — TELEPHONE ENCOUNTER
Spoke with patient and relayed Dr. Sameer Rosales message. Patient verbalized understanding. States he had been taking Benadryl and felt this was helping, but \"for some reason\" he discontinued Benadryl.  Encouraged patient to try Claritin, as this will be less l

## 2022-01-07 ENCOUNTER — NURSE ONLY (OUTPATIENT)
Dept: LAB | Facility: HOSPITAL | Age: 77
End: 2022-01-07
Attending: INTERNAL MEDICINE
Payer: MEDICARE

## 2022-01-07 DIAGNOSIS — Z20.822 EXPOSURE TO COVID-19 VIRUS: ICD-10-CM

## 2022-01-07 DIAGNOSIS — R05.9 COUGH: ICD-10-CM

## 2022-01-09 LAB — SARS-COV-2 RNA RESP QL NAA+PROBE: DETECTED

## 2022-01-10 NOTE — TELEPHONE ENCOUNTER
I called and spoke with spouse, Douglas Hardy (HIPAA verified). We discussed quarantine guidelines per CDC: stay at home for 5 days from sx onset. If symptoms are improving and fever free for 24 hrs, okay to leave home. Wear mask for an additional 5 days.   Patient

## 2022-01-10 NOTE — TELEPHONE ENCOUNTER
Patient & his wife tested positive for Covid   pt's have been isolating   feels like they have a bad cold & cough   requests call back to advise what are the    current covid protocols, how long are  they  Contagious, etc     Call back # [phone]    Me

## 2022-01-17 NOTE — TELEPHONE ENCOUNTER
Spoke to pt for condition update per MD request. Pt reports that he is feeling good. Pt feels that his symptoms were mild and felt similar to a head cold. He reports occasional nasal congestion and cough. Denies any fevers or SOB.  Pt has no further questio

## 2022-04-23 RX ORDER — ENALAPRIL MALEATE 10 MG/1
10 TABLET ORAL DAILY
Qty: 90 TABLET | Refills: 3 | OUTPATIENT
Start: 2022-04-23

## 2022-04-28 RX ORDER — ENALAPRIL MALEATE 10 MG/1
10 TABLET ORAL DAILY
Qty: 90 TABLET | Refills: 3 | OUTPATIENT
Start: 2022-04-28

## 2022-05-24 ENCOUNTER — TELEPHONE (OUTPATIENT)
Dept: INTERNAL MEDICINE CLINIC | Facility: CLINIC | Age: 77
End: 2022-05-24

## 2022-05-24 DIAGNOSIS — E78.00 HYPERCHOLESTEREMIA: Primary | ICD-10-CM

## 2022-05-24 DIAGNOSIS — Z12.5 SCREENING FOR PROSTATE CANCER: ICD-10-CM

## 2022-05-24 DIAGNOSIS — I10 ESSENTIAL HYPERTENSION: ICD-10-CM

## 2022-05-24 DIAGNOSIS — E11.8 TYPE 2 DIABETES MELLITUS WITH COMPLICATION, WITHOUT LONG-TERM CURRENT USE OF INSULIN (HCC): ICD-10-CM

## 2022-05-24 NOTE — TELEPHONE ENCOUNTER
Patient calling asking if he to have lab work prior to his Medicare Annual physical that is scheduled for 6/15. No orders in the system. Please call patient back.   Best call back number [phone]

## 2022-05-24 NOTE — TELEPHONE ENCOUNTER
To Dr. Xochilt Hayes---    Pt had CBC, CMP, lipid and a1c December 2021. Pended annual labs. Do you wish to repeat the labs drawn in December too?

## 2022-06-07 ENCOUNTER — LAB ENCOUNTER (OUTPATIENT)
Dept: LAB | Facility: HOSPITAL | Age: 77
End: 2022-06-07
Attending: INTERNAL MEDICINE
Payer: MEDICARE

## 2022-06-07 DIAGNOSIS — I10 ESSENTIAL HYPERTENSION: ICD-10-CM

## 2022-06-07 DIAGNOSIS — E78.00 HYPERCHOLESTEREMIA: ICD-10-CM

## 2022-06-07 DIAGNOSIS — Z12.5 SCREENING FOR PROSTATE CANCER: ICD-10-CM

## 2022-06-07 DIAGNOSIS — E11.8 TYPE 2 DIABETES MELLITUS WITH COMPLICATION, WITHOUT LONG-TERM CURRENT USE OF INSULIN (HCC): ICD-10-CM

## 2022-06-07 LAB
ALBUMIN SERPL-MCNC: 3.9 G/DL (ref 3.4–5)
ALBUMIN/GLOB SERPL: 1.1 {RATIO} (ref 1–2)
ALP LIVER SERPL-CCNC: 59 U/L
ALT SERPL-CCNC: 31 U/L
ANION GAP SERPL CALC-SCNC: 5 MMOL/L (ref 0–18)
AST SERPL-CCNC: 28 U/L (ref 15–37)
BASOPHILS # BLD AUTO: 0.04 X10(3) UL (ref 0–0.2)
BASOPHILS NFR BLD AUTO: 0.5 %
BILIRUB SERPL-MCNC: 0.7 MG/DL (ref 0.1–2)
BUN BLD-MCNC: 17 MG/DL (ref 7–18)
BUN/CREAT SERPL: 12.8 (ref 10–20)
CALCIUM BLD-MCNC: 8.7 MG/DL (ref 8.5–10.1)
CHLORIDE SERPL-SCNC: 105 MMOL/L (ref 98–112)
CHOLEST SERPL-MCNC: 97 MG/DL (ref ?–200)
CO2 SERPL-SCNC: 28 MMOL/L (ref 21–32)
COMPLEXED PSA SERPL-MCNC: 0.72 NG/ML (ref ?–4)
CREAT BLD-MCNC: 1.33 MG/DL
DEPRECATED RDW RBC AUTO: 45.7 FL (ref 35.1–46.3)
EOSINOPHIL # BLD AUTO: 0.15 X10(3) UL (ref 0–0.7)
EOSINOPHIL NFR BLD AUTO: 1.9 %
ERYTHROCYTE [DISTWIDTH] IN BLOOD BY AUTOMATED COUNT: 13.5 % (ref 11–15)
EST. AVERAGE GLUCOSE BLD GHB EST-MCNC: 128 MG/DL (ref 68–126)
FASTING PATIENT LIPID ANSWER: YES
FASTING STATUS PATIENT QL REPORTED: YES
GLOBULIN PLAS-MCNC: 3.5 G/DL (ref 2.8–4.4)
GLUCOSE BLD-MCNC: 112 MG/DL (ref 70–99)
HBA1C MFR BLD: 6.1 % (ref ?–5.7)
HCT VFR BLD AUTO: 44.9 %
HDLC SERPL-MCNC: 41 MG/DL (ref 40–59)
HGB BLD-MCNC: 14.3 G/DL
IMM GRANULOCYTES # BLD AUTO: 0.04 X10(3) UL (ref 0–1)
IMM GRANULOCYTES NFR BLD: 0.5 %
LDLC SERPL CALC-MCNC: 42 MG/DL (ref ?–100)
LYMPHOCYTES # BLD AUTO: 2.97 X10(3) UL (ref 1–4)
LYMPHOCYTES NFR BLD AUTO: 37 %
MCH RBC QN AUTO: 29.9 PG (ref 26–34)
MCHC RBC AUTO-ENTMCNC: 31.8 G/DL (ref 31–37)
MCV RBC AUTO: 93.7 FL
MONOCYTES # BLD AUTO: 0.86 X10(3) UL (ref 0.1–1)
MONOCYTES NFR BLD AUTO: 10.7 %
NEUTROPHILS # BLD AUTO: 3.96 X10 (3) UL (ref 1.5–7.7)
NEUTROPHILS # BLD AUTO: 3.96 X10(3) UL (ref 1.5–7.7)
NEUTROPHILS NFR BLD AUTO: 49.4 %
NONHDLC SERPL-MCNC: 56 MG/DL (ref ?–130)
OSMOLALITY SERPL CALC.SUM OF ELEC: 288 MOSM/KG (ref 275–295)
PLATELET # BLD AUTO: 163 10(3)UL (ref 150–450)
POTASSIUM SERPL-SCNC: 4 MMOL/L (ref 3.5–5.1)
PROT SERPL-MCNC: 7.4 G/DL (ref 6.4–8.2)
RBC # BLD AUTO: 4.79 X10(6)UL
SODIUM SERPL-SCNC: 138 MMOL/L (ref 136–145)
TRIGL SERPL-MCNC: 62 MG/DL (ref 30–149)
TSI SER-ACNC: 1.86 MIU/ML (ref 0.36–3.74)
VLDLC SERPL CALC-MCNC: 9 MG/DL (ref 0–30)
WBC # BLD AUTO: 8 X10(3) UL (ref 4–11)

## 2022-06-07 PROCEDURE — 85025 COMPLETE CBC W/AUTO DIFF WBC: CPT

## 2022-06-07 PROCEDURE — 80061 LIPID PANEL: CPT

## 2022-06-07 PROCEDURE — 80053 COMPREHEN METABOLIC PANEL: CPT

## 2022-06-07 PROCEDURE — 36415 COLL VENOUS BLD VENIPUNCTURE: CPT

## 2022-06-07 PROCEDURE — 84443 ASSAY THYROID STIM HORMONE: CPT

## 2022-06-07 PROCEDURE — 83036 HEMOGLOBIN GLYCOSYLATED A1C: CPT

## 2022-06-15 ENCOUNTER — OFFICE VISIT (OUTPATIENT)
Dept: INTERNAL MEDICINE CLINIC | Facility: CLINIC | Age: 77
End: 2022-06-15
Payer: COMMERCIAL

## 2022-06-15 VITALS
HEIGHT: 70 IN | SYSTOLIC BLOOD PRESSURE: 116 MMHG | OXYGEN SATURATION: 98 % | HEART RATE: 63 BPM | DIASTOLIC BLOOD PRESSURE: 66 MMHG | BODY MASS INDEX: 27.2 KG/M2 | TEMPERATURE: 98 F | WEIGHT: 190 LBS

## 2022-06-15 DIAGNOSIS — I10 ESSENTIAL HYPERTENSION: ICD-10-CM

## 2022-06-15 DIAGNOSIS — E78.00 HYPERCHOLESTEREMIA: ICD-10-CM

## 2022-06-15 DIAGNOSIS — Z00.00 ENCOUNTER FOR MEDICARE ANNUAL WELLNESS EXAM: Primary | ICD-10-CM

## 2022-06-15 DIAGNOSIS — E11.8 TYPE 2 DIABETES MELLITUS WITH COMPLICATION, WITHOUT LONG-TERM CURRENT USE OF INSULIN (HCC): ICD-10-CM

## 2022-06-15 DIAGNOSIS — R91.1 NODULE OF APEX OF RIGHT LUNG: ICD-10-CM

## 2022-06-15 DIAGNOSIS — I73.9 PVD (PERIPHERAL VASCULAR DISEASE) (HCC): ICD-10-CM

## 2022-06-15 RX ORDER — ENALAPRIL MALEATE 10 MG/1
10 TABLET ORAL DAILY
Qty: 90 TABLET | Refills: 3 | Status: SHIPPED | OUTPATIENT
Start: 2022-06-15

## 2022-06-15 RX ORDER — ATORVASTATIN CALCIUM 20 MG/1
20 TABLET, FILM COATED ORAL NIGHTLY
Qty: 90 TABLET | Refills: 3 | Status: SHIPPED | OUTPATIENT
Start: 2022-06-15

## 2022-06-23 ENCOUNTER — OFFICE VISIT (OUTPATIENT)
Dept: INTERNAL MEDICINE CLINIC | Facility: CLINIC | Age: 77
End: 2022-06-23
Payer: COMMERCIAL

## 2022-06-23 VITALS
TEMPERATURE: 98 F | DIASTOLIC BLOOD PRESSURE: 70 MMHG | OXYGEN SATURATION: 98 % | WEIGHT: 189 LBS | HEART RATE: 76 BPM | SYSTOLIC BLOOD PRESSURE: 160 MMHG | HEIGHT: 70 IN | BODY MASS INDEX: 27.06 KG/M2

## 2022-06-23 DIAGNOSIS — N18.30 STAGE 3 CHRONIC KIDNEY DISEASE, UNSPECIFIED WHETHER STAGE 3A OR 3B CKD (HCC): Chronic | ICD-10-CM

## 2022-06-23 DIAGNOSIS — R35.0 URINARY FREQUENCY: Primary | ICD-10-CM

## 2022-06-23 DIAGNOSIS — I10 ESSENTIAL HYPERTENSION: ICD-10-CM

## 2022-06-23 DIAGNOSIS — E11.8 TYPE 2 DIABETES MELLITUS WITH COMPLICATION, WITHOUT LONG-TERM CURRENT USE OF INSULIN (HCC): ICD-10-CM

## 2022-06-23 DIAGNOSIS — E78.00 HYPERCHOLESTEREMIA: ICD-10-CM

## 2022-06-23 LAB
APPEARANCE: CLEAR
BILIRUB UR QL: NEGATIVE
BILIRUBIN: NEGATIVE
CLARITY UR: CLEAR
COLOR UR: YELLOW
GLUCOSE (URINE DIPSTICK): NEGATIVE MG/DL
GLUCOSE UR-MCNC: NEGATIVE MG/DL
HGB UR QL STRIP.AUTO: NEGATIVE
KETONES (URINE DIPSTICK): NEGATIVE MG/DL
KETONES UR-MCNC: NEGATIVE MG/DL
LEUKOCYTE ESTERASE UR QL STRIP.AUTO: NEGATIVE
LEUKOCYTES: NEGATIVE
MULTISTIX LOT#: ABNORMAL NUMERIC
NITRITE UR QL STRIP.AUTO: NEGATIVE
NITRITE, URINE: NEGATIVE
PH UR: 6 [PH] (ref 5–8)
PH, URINE: 6 (ref 4.5–8)
PROT UR-MCNC: NEGATIVE MG/DL
PROTEIN (URINE DIPSTICK): NEGATIVE MG/DL
SP GR UR STRIP: 1.02 (ref 1–1.03)
SPECIFIC GRAVITY: 1.01 (ref 1–1.03)
URINE-COLOR: YELLOW
UROBILINOGEN UR STRIP-ACNC: 0.2
UROBILINOGEN,SEMI-QN: 0.2 MG/DL (ref 0–1.9)

## 2022-06-23 PROCEDURE — 87086 URINE CULTURE/COLONY COUNT: CPT | Performed by: INTERNAL MEDICINE

## 2022-06-23 PROCEDURE — 3077F SYST BP >= 140 MM HG: CPT | Performed by: INTERNAL MEDICINE

## 2022-06-23 PROCEDURE — 99214 OFFICE O/P EST MOD 30 MIN: CPT | Performed by: INTERNAL MEDICINE

## 2022-06-23 PROCEDURE — 3078F DIAST BP <80 MM HG: CPT | Performed by: INTERNAL MEDICINE

## 2022-06-23 PROCEDURE — 3008F BODY MASS INDEX DOCD: CPT | Performed by: INTERNAL MEDICINE

## 2022-06-23 PROCEDURE — 1126F AMNT PAIN NOTED NONE PRSNT: CPT | Performed by: INTERNAL MEDICINE

## 2022-06-23 PROCEDURE — 81002 URINALYSIS NONAUTO W/O SCOPE: CPT | Performed by: INTERNAL MEDICINE

## 2022-06-23 RX ORDER — SULFAMETHOXAZOLE AND TRIMETHOPRIM 800; 160 MG/1; MG/1
1 TABLET ORAL 2 TIMES DAILY
Qty: 14 TABLET | Refills: 0 | Status: SHIPPED | OUTPATIENT
Start: 2022-06-23 | End: 2022-06-27

## 2022-06-24 ENCOUNTER — TELEPHONE (OUTPATIENT)
Dept: INTERNAL MEDICINE CLINIC | Facility: CLINIC | Age: 77
End: 2022-06-24

## 2022-06-24 DIAGNOSIS — R35.0 URINARY FREQUENCY: Primary | ICD-10-CM

## 2022-06-24 NOTE — TELEPHONE ENCOUNTER
Dr Trini Ko, urine culture ordered and reference lab called. They will pull urine and do a urine culture with new order.

## 2022-06-27 ENCOUNTER — HOSPITAL ENCOUNTER (OUTPATIENT)
Dept: ULTRASOUND IMAGING | Facility: HOSPITAL | Age: 77
Discharge: HOME | End: 2022-06-27
Attending: INTERNAL MEDICINE
Payer: MEDICARE

## 2022-06-27 ENCOUNTER — TELEPHONE (OUTPATIENT)
Dept: INTERNAL MEDICINE CLINIC | Facility: CLINIC | Age: 77
End: 2022-06-27

## 2022-06-27 ENCOUNTER — OFFICE VISIT (OUTPATIENT)
Dept: INTERNAL MEDICINE CLINIC | Facility: CLINIC | Age: 77
End: 2022-06-27
Payer: COMMERCIAL

## 2022-06-27 VITALS
WEIGHT: 189.81 LBS | BODY MASS INDEX: 27.17 KG/M2 | SYSTOLIC BLOOD PRESSURE: 142 MMHG | TEMPERATURE: 98 F | DIASTOLIC BLOOD PRESSURE: 78 MMHG | HEART RATE: 78 BPM | OXYGEN SATURATION: 98 % | HEIGHT: 70 IN

## 2022-06-27 DIAGNOSIS — E13.9 DIABETES 1.5, MANAGED AS TYPE 2 (HCC): ICD-10-CM

## 2022-06-27 DIAGNOSIS — R10.2 PELVIC PAIN: ICD-10-CM

## 2022-06-27 DIAGNOSIS — N40.0 PROSTATISM: ICD-10-CM

## 2022-06-27 DIAGNOSIS — R10.2 PELVIC PAIN: Primary | ICD-10-CM

## 2022-06-27 PROCEDURE — 99214 OFFICE O/P EST MOD 30 MIN: CPT | Performed by: INTERNAL MEDICINE

## 2022-06-27 PROCEDURE — 3077F SYST BP >= 140 MM HG: CPT | Performed by: INTERNAL MEDICINE

## 2022-06-27 PROCEDURE — 3008F BODY MASS INDEX DOCD: CPT | Performed by: INTERNAL MEDICINE

## 2022-06-27 PROCEDURE — 3078F DIAST BP <80 MM HG: CPT | Performed by: INTERNAL MEDICINE

## 2022-06-27 PROCEDURE — 76770 US EXAM ABDO BACK WALL COMP: CPT | Performed by: INTERNAL MEDICINE

## 2022-06-27 PROCEDURE — 1126F AMNT PAIN NOTED NONE PRSNT: CPT | Performed by: INTERNAL MEDICINE

## 2022-06-27 RX ORDER — TAMSULOSIN HYDROCHLORIDE 0.4 MG/1
0.4 CAPSULE ORAL DAILY
Qty: 90 CAPSULE | Refills: 3 | Status: SHIPPED | OUTPATIENT
Start: 2022-06-27 | End: 2022-07-27

## 2022-06-27 NOTE — TELEPHONE ENCOUNTER
Please notify patient that the urine culture came back negative. Should still complete the course of antibiotics as prescribed.   Lets also get a condition update

## 2022-06-27 NOTE — TELEPHONE ENCOUNTER
Please tell him the results of the ultrasound look reasonably good. His kidneys look good, the right kidney has a cyst of no significance. His bladder shows more residual urine in it after voiding normal but not alarming. Start the medication that we ordered today and follow-up with urology as discussed.

## 2022-06-27 NOTE — TELEPHONE ENCOUNTER
Pt's wife called  Pt experienced cramping \"all over\" while taking medication prescribed on 6/23  Stopped taking the med & cramping stop    Pt wanted to see Dr Pierre Caballero today/June 27    Scheduled appt at 3:30 pm

## 2022-06-29 ENCOUNTER — TELEPHONE (OUTPATIENT)
Dept: INTERNAL MEDICINE CLINIC | Facility: CLINIC | Age: 77
End: 2022-06-29

## 2022-06-29 RX ORDER — BLOOD-GLUCOSE METER
EACH MISCELLANEOUS
Qty: 1 KIT | Refills: 0 | Status: CANCELLED | OUTPATIENT
Start: 2022-06-29

## 2022-06-29 NOTE — TELEPHONE ENCOUNTER
I responded to a telephone call from the patient's pharmacy on June 23 and gave them the okay for the patient to take Bactrim DS while taking lisinopril.

## 2022-06-29 NOTE — TELEPHONE ENCOUNTER
Patients wife Tomás Dickinson is calling for refill for   Lancets  Test Strips  Patient uses a One Touch Verio IQ    It has been along time since Dr Kalli Lala prescribed these, patient is prediabetic.     [address]

## 2022-06-30 ENCOUNTER — TELEPHONE (OUTPATIENT)
Dept: INTERNAL MEDICINE CLINIC | Facility: CLINIC | Age: 77
End: 2022-06-30

## 2022-07-05 ENCOUNTER — TELEPHONE (OUTPATIENT)
Dept: INTERNAL MEDICINE CLINIC | Facility: CLINIC | Age: 77
End: 2022-07-05

## 2022-07-05 NOTE — TELEPHONE ENCOUNTER
Pt came to office to ask about whether or not he is supposed to get a refill of the blood test strips for prediabetic monitoring. Pharmacy states no refill was sent and to check with Dr about if the monitoring is still necessary.  Pt asks he be called or messaged w the Dr's response

## 2022-07-05 NOTE — TELEPHONE ENCOUNTER
Radha Rivera or RX team, need assistance in ordering strips, per patient he is currently using verio IQ machine, patient has been waiting for order since his last visit , per MD okay to fill, please assist.

## 2022-07-07 RX ORDER — BLOOD-GLUCOSE METER
EACH MISCELLANEOUS
Qty: 1 KIT | Refills: 1 | Status: SHIPPED | OUTPATIENT
Start: 2022-07-07

## 2022-07-07 RX ORDER — BLOOD SUGAR DIAGNOSTIC
STRIP MISCELLANEOUS
Qty: 100 STRIP | Refills: 3 | Status: SHIPPED | OUTPATIENT
Start: 2022-07-07

## 2022-07-07 RX ORDER — LANCETS
EACH MISCELLANEOUS
Qty: 100 EACH | Refills: 3 | Status: SHIPPED | OUTPATIENT
Start: 2022-07-07

## 2022-07-08 RX ORDER — BLOOD SUGAR DIAGNOSTIC
STRIP MISCELLANEOUS
Qty: 100 STRIP | Refills: 0 | OUTPATIENT
Start: 2022-07-08

## 2022-07-18 ENCOUNTER — PATIENT MESSAGE (OUTPATIENT)
Dept: INTERNAL MEDICINE CLINIC | Facility: CLINIC | Age: 77
End: 2022-07-18

## 2022-07-19 NOTE — TELEPHONE ENCOUNTER
From: Mesha Rondon  Sent: 7/18/2022 9:28 AM CDT  To: Shalini Hurtado Clinical Staff  Subject: Results    Set up appointment with urologist Juan Antonio Sykes on August 16th. .taking flowmax as prescribed. Please ask  If he still needs to see me next Monday. Tricia Barnes appt was made after last visit per his request. Thank you, Lino Novoa

## 2022-08-12 ENCOUNTER — IMMUNIZATION (OUTPATIENT)
Dept: LAB | Age: 77
End: 2022-08-12
Attending: EMERGENCY MEDICINE
Payer: MEDICARE

## 2022-08-12 DIAGNOSIS — Z23 NEED FOR VACCINATION: Primary | ICD-10-CM

## 2022-08-12 PROCEDURE — 0064A SARSCOV2 VAC 50MCG/0.25ML IM: CPT

## 2022-08-16 ENCOUNTER — OFFICE VISIT (OUTPATIENT)
Dept: SURGERY | Facility: CLINIC | Age: 77
End: 2022-08-16
Payer: COMMERCIAL

## 2022-08-16 VITALS
HEART RATE: 80 BPM | WEIGHT: 189 LBS | SYSTOLIC BLOOD PRESSURE: 157 MMHG | DIASTOLIC BLOOD PRESSURE: 68 MMHG | BODY MASS INDEX: 27 KG/M2

## 2022-08-16 DIAGNOSIS — N40.1 BPH WITH OBSTRUCTION/LOWER URINARY TRACT SYMPTOMS: Primary | ICD-10-CM

## 2022-08-16 DIAGNOSIS — N13.8 BPH WITH OBSTRUCTION/LOWER URINARY TRACT SYMPTOMS: Primary | ICD-10-CM

## 2022-08-16 PROCEDURE — 99204 OFFICE O/P NEW MOD 45 MIN: CPT | Performed by: UROLOGY

## 2022-08-16 PROCEDURE — 3077F SYST BP >= 140 MM HG: CPT | Performed by: UROLOGY

## 2022-08-16 PROCEDURE — 3078F DIAST BP <80 MM HG: CPT | Performed by: UROLOGY

## 2022-08-16 RX ORDER — TAMSULOSIN HYDROCHLORIDE 0.4 MG/1
CAPSULE ORAL
COMMUNITY

## 2022-10-02 LAB — AMB EXT COVID-19 RESULT: DETECTED

## 2022-10-03 ENCOUNTER — TELEPHONE (OUTPATIENT)
Dept: INTERNAL MEDICINE CLINIC | Facility: CLINIC | Age: 77
End: 2022-10-03

## 2022-10-03 NOTE — TELEPHONE ENCOUNTER
COVID triage:    Start of symptoms: Friday 09/30,  Tested positive 10/02    Fever:  [x]  No fever  []  Temperature:   []  Chills  []  Night sweats    Cough:  [] Productive cough  [] Cough with exertion  [x] Dry cough    Breathing:  [] Wheezing  [] Pain with deep breathing  [] SOB with exertion  [] SOB at rest  [] Heavy breathing  [] Chest discomfort with deep breathing or coughing    GI Symptoms:  [] Diarrhea  [] Nausea  [] Vomiting  [] Abdominal pain  [] Lack of appetite    Other symptoms:  [x] Sore throat  [] Difficulty swallowing  [x] Nasal drainage  [x] Nasal congestion  [x] PND  [] Sinus pressure  [] Chest congestion  [x] Head congestion  [] Facial pain   [] Ear pain  [] Body aches  [] Loss of sense of smell   [] Loss of sense of taste  []Conjunctivitis  [] Headache  [] Fatigue  [] Weakness    []OTC Medications:      Vaccinated: Yes  [x]   No []  Booster:  Yes  [x]  No []      To Dr. Stevenson Rivers:  Patient was notified that this message will be routed to the physician to determine what their recommendation (s) would be. In the meantime, if they develop new or worsening symptoms, they were advised to call back or seek emergent evaluation at the ER. Discussed the following quarantine guidelines and instructions: According to CDC guidelines,  If symptomatic: you should stay home and quarantine for 5 full days. Day 1 is the first full day after your symptoms developed (24 hrs later). Wear a well-fitted mask if you must be around others in your home. End isolation after completing 5 full days, fever-free for 24 hours (without the use of fever-reducing medication), and your symptoms are improving. If you were severely ill with COVID-19 you should isolate for at least 10 days. Consult your doctor before ending isolation. Wear a well-fitted mask for 10 full days any time you are around others inside your home or in public. Do not go to places where you are unable to wear a mask.  Avoid being around people who are at high risk. Do not travel until a full 10 days after your symptoms started. Monitor your symptoms at home and call the office with any new or worsening symptoms. ER is recommended should you develop shortness of breath, chest pain, high fever that's non-responsive to fever reducing medicine, or spo2 (oxygen level) <90% (if pulse ox is accesible).

## 2022-10-03 NOTE — TELEPHONE ENCOUNTER
Send in Paxlovid (higher dosing schedule ) and stop atorvastatin while on Paxlovid then can resume. Take Tylenol and Robitussin as needed for myalgias and cough.

## 2022-10-03 NOTE — TELEPHONE ENCOUNTER
Martha Sauceda called back to add   she tested patient 2X both tests were positive  Dub Aidan tested negative)     Cancelling appts with Dr Alita Schaumann & for lung screening tests scheduled tomorrow 10/4

## 2022-10-14 ENCOUNTER — HOSPITAL ENCOUNTER (OUTPATIENT)
Dept: CT IMAGING | Facility: HOSPITAL | Age: 77
Discharge: HOME | End: 2022-10-14
Attending: INTERNAL MEDICINE
Payer: MEDICARE

## 2022-10-14 DIAGNOSIS — R91.1 NODULE OF APEX OF RIGHT LUNG: ICD-10-CM

## 2022-10-14 PROCEDURE — 71271 CT THORAX LUNG CANCER SCR C-: CPT | Performed by: INTERNAL MEDICINE

## 2022-10-29 ENCOUNTER — TELEPHONE (OUTPATIENT)
Dept: INTERNAL MEDICINE CLINIC | Facility: CLINIC | Age: 77
End: 2022-10-29

## 2022-10-31 NOTE — TELEPHONE ENCOUNTER
Spoke to patient's wife Gabriella Blum (OK per HIPAA)  and relayed MD message. Gabriella Blum  verbalized understanding and agrees with plan. Sched appt with Dr Vannesa Méndez on 11/1.

## 2022-11-01 ENCOUNTER — IMMUNIZATION (OUTPATIENT)
Dept: INTERNAL MEDICINE CLINIC | Facility: CLINIC | Age: 77
End: 2022-11-01
Payer: COMMERCIAL

## 2022-11-01 DIAGNOSIS — Z23 NEED FOR VACCINATION: Primary | ICD-10-CM

## 2022-11-01 PROCEDURE — 90662 IIV NO PRSV INCREASED AG IM: CPT | Performed by: INTERNAL MEDICINE

## 2022-11-01 PROCEDURE — G0008 ADMIN INFLUENZA VIRUS VAC: HCPCS | Performed by: INTERNAL MEDICINE

## 2022-11-22 ENCOUNTER — OFFICE VISIT (OUTPATIENT)
Dept: INTERNAL MEDICINE CLINIC | Facility: CLINIC | Age: 77
End: 2022-11-22
Payer: COMMERCIAL

## 2022-11-22 VITALS
WEIGHT: 192 LBS | HEIGHT: 70 IN | HEART RATE: 68 BPM | DIASTOLIC BLOOD PRESSURE: 62 MMHG | OXYGEN SATURATION: 97 % | SYSTOLIC BLOOD PRESSURE: 170 MMHG | BODY MASS INDEX: 27.49 KG/M2

## 2022-11-22 DIAGNOSIS — N40.0 PROSTATISM: ICD-10-CM

## 2022-11-22 DIAGNOSIS — I10 ESSENTIAL HYPERTENSION: Primary | ICD-10-CM

## 2022-11-22 DIAGNOSIS — E78.00 HYPERCHOLESTEREMIA: ICD-10-CM

## 2022-11-22 PROCEDURE — 3077F SYST BP >= 140 MM HG: CPT | Performed by: INTERNAL MEDICINE

## 2022-11-22 PROCEDURE — 3008F BODY MASS INDEX DOCD: CPT | Performed by: INTERNAL MEDICINE

## 2022-11-22 PROCEDURE — 1126F AMNT PAIN NOTED NONE PRSNT: CPT | Performed by: INTERNAL MEDICINE

## 2022-11-22 PROCEDURE — 3078F DIAST BP <80 MM HG: CPT | Performed by: INTERNAL MEDICINE

## 2022-11-22 PROCEDURE — 99214 OFFICE O/P EST MOD 30 MIN: CPT | Performed by: INTERNAL MEDICINE

## 2022-12-06 ENCOUNTER — TELEPHONE (OUTPATIENT)
Dept: SURGERY | Facility: CLINIC | Age: 77
End: 2022-12-06

## 2022-12-06 RX ORDER — DUTASTERIDE 0.5 MG/1
0.5 CAPSULE, LIQUID FILLED ORAL DAILY
Qty: 90 CAPSULE | Refills: 3 | Status: SHIPPED | OUTPATIENT
Start: 2022-12-06

## 2022-12-06 NOTE — TELEPHONE ENCOUNTER
This patient accompanied his wife who had an appointment to see me today. I had seen him most recently August 2022 for BPH and lower urinary tract symptoms. Remains on tamsulosin but wishes consideration given BPH on exam to be started on dutasteride as well. He tells me he had a TURP of the prostate many years ago by Dr. Christine Adame. I reviewed side effects with him and a prescription will be sent I asked the patient to follow-up in 3 to 4 months to assess response.

## 2023-01-23 ENCOUNTER — LAB ENCOUNTER (OUTPATIENT)
Dept: LAB | Facility: HOSPITAL | Age: 78
End: 2023-01-23
Attending: INTERNAL MEDICINE
Payer: MEDICARE

## 2023-01-23 DIAGNOSIS — E78.00 HYPERCHOLESTEREMIA: ICD-10-CM

## 2023-01-23 LAB
ALBUMIN SERPL-MCNC: 3.8 G/DL (ref 3.4–5)
ALBUMIN/GLOB SERPL: 1 {RATIO} (ref 1–2)
ALP LIVER SERPL-CCNC: 67 U/L
ALT SERPL-CCNC: 39 U/L
ANION GAP SERPL CALC-SCNC: 6 MMOL/L (ref 0–18)
AST SERPL-CCNC: 21 U/L (ref 15–37)
BASOPHILS # BLD AUTO: 0.06 X10(3) UL (ref 0–0.2)
BASOPHILS NFR BLD AUTO: 0.8 %
BILIRUB SERPL-MCNC: 0.8 MG/DL (ref 0.1–2)
BUN BLD-MCNC: 18 MG/DL (ref 7–18)
BUN/CREAT SERPL: 14 (ref 10–20)
CALCIUM BLD-MCNC: 8.6 MG/DL (ref 8.5–10.1)
CHLORIDE SERPL-SCNC: 105 MMOL/L (ref 98–112)
CHOLEST SERPL-MCNC: 90 MG/DL (ref ?–200)
CO2 SERPL-SCNC: 29 MMOL/L (ref 21–32)
CREAT BLD-MCNC: 1.29 MG/DL
DEPRECATED RDW RBC AUTO: 44.3 FL (ref 35.1–46.3)
EOSINOPHIL # BLD AUTO: 0.14 X10(3) UL (ref 0–0.7)
EOSINOPHIL NFR BLD AUTO: 1.8 %
ERYTHROCYTE [DISTWIDTH] IN BLOOD BY AUTOMATED COUNT: 13.1 % (ref 11–15)
EST. AVERAGE GLUCOSE BLD GHB EST-MCNC: 126 MG/DL (ref 68–126)
FASTING PATIENT LIPID ANSWER: YES
FASTING STATUS PATIENT QL REPORTED: YES
GFR SERPLBLD BASED ON 1.73 SQ M-ARVRAT: 57 ML/MIN/1.73M2 (ref 60–?)
GLOBULIN PLAS-MCNC: 3.7 G/DL (ref 2.8–4.4)
GLUCOSE BLD-MCNC: 113 MG/DL (ref 70–99)
HBA1C MFR BLD: 6 % (ref ?–5.7)
HCT VFR BLD AUTO: 45 %
HDLC SERPL-MCNC: 44 MG/DL (ref 40–59)
HGB BLD-MCNC: 14.2 G/DL
IMM GRANULOCYTES # BLD AUTO: 0.04 X10(3) UL (ref 0–1)
IMM GRANULOCYTES NFR BLD: 0.5 %
LDLC SERPL CALC-MCNC: 30 MG/DL (ref ?–100)
LYMPHOCYTES # BLD AUTO: 2.87 X10(3) UL (ref 1–4)
LYMPHOCYTES NFR BLD AUTO: 37.1 %
MCH RBC QN AUTO: 29.2 PG (ref 26–34)
MCHC RBC AUTO-ENTMCNC: 31.6 G/DL (ref 31–37)
MCV RBC AUTO: 92.6 FL
MONOCYTES # BLD AUTO: 0.77 X10(3) UL (ref 0.1–1)
MONOCYTES NFR BLD AUTO: 9.9 %
NEUTROPHILS # BLD AUTO: 3.86 X10 (3) UL (ref 1.5–7.7)
NEUTROPHILS # BLD AUTO: 3.86 X10(3) UL (ref 1.5–7.7)
NEUTROPHILS NFR BLD AUTO: 49.9 %
NONHDLC SERPL-MCNC: 46 MG/DL (ref ?–130)
OSMOLALITY SERPL CALC.SUM OF ELEC: 293 MOSM/KG (ref 275–295)
PLATELET # BLD AUTO: 164 10(3)UL (ref 150–450)
POTASSIUM SERPL-SCNC: 4 MMOL/L (ref 3.5–5.1)
PROT SERPL-MCNC: 7.5 G/DL (ref 6.4–8.2)
RBC # BLD AUTO: 4.86 X10(6)UL
SODIUM SERPL-SCNC: 140 MMOL/L (ref 136–145)
TRIGL SERPL-MCNC: 74 MG/DL (ref 30–149)
TSI SER-ACNC: 2.19 MIU/ML (ref 0.36–3.74)
VLDLC SERPL CALC-MCNC: 10 MG/DL (ref 0–30)
WBC # BLD AUTO: 7.7 X10(3) UL (ref 4–11)

## 2023-01-23 PROCEDURE — 80053 COMPREHEN METABOLIC PANEL: CPT

## 2023-01-23 PROCEDURE — 80061 LIPID PANEL: CPT

## 2023-01-23 PROCEDURE — 36415 COLL VENOUS BLD VENIPUNCTURE: CPT

## 2023-01-23 PROCEDURE — 85025 COMPLETE CBC W/AUTO DIFF WBC: CPT

## 2023-01-23 PROCEDURE — 83036 HEMOGLOBIN GLYCOSYLATED A1C: CPT

## 2023-01-23 PROCEDURE — 84443 ASSAY THYROID STIM HORMONE: CPT

## 2023-01-24 ENCOUNTER — OFFICE VISIT (OUTPATIENT)
Dept: INTERNAL MEDICINE CLINIC | Facility: CLINIC | Age: 78
End: 2023-01-24

## 2023-01-24 VITALS
WEIGHT: 192 LBS | TEMPERATURE: 98 F | SYSTOLIC BLOOD PRESSURE: 140 MMHG | DIASTOLIC BLOOD PRESSURE: 72 MMHG | HEART RATE: 71 BPM | OXYGEN SATURATION: 99 % | BODY MASS INDEX: 27.49 KG/M2 | HEIGHT: 70 IN

## 2023-01-24 DIAGNOSIS — I10 ESSENTIAL HYPERTENSION: Primary | ICD-10-CM

## 2023-01-24 DIAGNOSIS — E78.00 HYPERCHOLESTEREMIA: ICD-10-CM

## 2023-01-24 DIAGNOSIS — R10.30 LOWER ABDOMINAL PAIN: ICD-10-CM

## 2023-01-24 DIAGNOSIS — I73.9 PVD (PERIPHERAL VASCULAR DISEASE) (HCC): ICD-10-CM

## 2023-01-24 PROCEDURE — 1126F AMNT PAIN NOTED NONE PRSNT: CPT | Performed by: INTERNAL MEDICINE

## 2023-01-24 PROCEDURE — 3078F DIAST BP <80 MM HG: CPT | Performed by: INTERNAL MEDICINE

## 2023-01-24 PROCEDURE — 3077F SYST BP >= 140 MM HG: CPT | Performed by: INTERNAL MEDICINE

## 2023-01-24 PROCEDURE — 99214 OFFICE O/P EST MOD 30 MIN: CPT | Performed by: INTERNAL MEDICINE

## 2023-01-24 PROCEDURE — 3008F BODY MASS INDEX DOCD: CPT | Performed by: INTERNAL MEDICINE

## 2023-02-01 ENCOUNTER — HOSPITAL ENCOUNTER (OUTPATIENT)
Dept: CT IMAGING | Facility: HOSPITAL | Age: 78
Discharge: HOME | End: 2023-02-01
Attending: INTERNAL MEDICINE
Payer: MEDICARE

## 2023-02-01 DIAGNOSIS — R10.30 LOWER ABDOMINAL PAIN: ICD-10-CM

## 2023-02-01 PROCEDURE — 74177 CT ABD & PELVIS W/CONTRAST: CPT | Performed by: INTERNAL MEDICINE

## 2023-02-11 ENCOUNTER — TELEPHONE (OUTPATIENT)
Dept: INTERNAL MEDICINE CLINIC | Facility: CLINIC | Age: 78
End: 2023-02-11

## 2023-02-22 ENCOUNTER — LAB ENCOUNTER (OUTPATIENT)
Dept: LAB | Facility: HOSPITAL | Age: 78
End: 2023-02-22
Attending: CLINICAL NURSE SPECIALIST
Payer: MEDICARE

## 2023-02-22 DIAGNOSIS — I10 ESSENTIAL HYPERTENSION: ICD-10-CM

## 2023-02-22 DIAGNOSIS — R00.9 ABNORMAL HEART RATE: Primary | ICD-10-CM

## 2023-02-22 PROCEDURE — 93010 ELECTROCARDIOGRAM REPORT: CPT | Performed by: INTERNAL MEDICINE

## 2023-02-22 PROCEDURE — 93005 ELECTROCARDIOGRAM TRACING: CPT

## 2023-02-23 LAB
ATRIAL RATE: 58 BPM
P AXIS: 41 DEGREES
P-R INTERVAL: 164 MS
Q-T INTERVAL: 436 MS
QRS DURATION: 100 MS
QTC CALCULATION (BEZET): 428 MS
R AXIS: -26 DEGREES
T AXIS: 48 DEGREES
VENTRICULAR RATE: 58 BPM

## 2023-04-10 ENCOUNTER — OFFICE VISIT (OUTPATIENT)
Dept: SURGERY | Facility: CLINIC | Age: 78
End: 2023-04-10

## 2023-04-10 DIAGNOSIS — N13.8 BPH WITH OBSTRUCTION/LOWER URINARY TRACT SYMPTOMS: Primary | ICD-10-CM

## 2023-04-10 DIAGNOSIS — N40.1 BPH WITH OBSTRUCTION/LOWER URINARY TRACT SYMPTOMS: Primary | ICD-10-CM

## 2023-04-10 PROCEDURE — 99213 OFFICE O/P EST LOW 20 MIN: CPT | Performed by: UROLOGY

## 2023-05-01 ENCOUNTER — OFFICE VISIT (OUTPATIENT)
Dept: INTERNAL MEDICINE CLINIC | Facility: CLINIC | Age: 78
End: 2023-05-01

## 2023-05-01 VITALS
HEIGHT: 70 IN | TEMPERATURE: 98 F | HEART RATE: 72 BPM | WEIGHT: 189 LBS | SYSTOLIC BLOOD PRESSURE: 140 MMHG | DIASTOLIC BLOOD PRESSURE: 60 MMHG | OXYGEN SATURATION: 99 % | BODY MASS INDEX: 27.06 KG/M2

## 2023-05-01 DIAGNOSIS — Z00.00 ENCOUNTER FOR MEDICARE ANNUAL WELLNESS EXAM: Primary | ICD-10-CM

## 2023-05-01 DIAGNOSIS — E78.00 HYPERCHOLESTEREMIA: ICD-10-CM

## 2023-05-01 DIAGNOSIS — I73.9 PVD (PERIPHERAL VASCULAR DISEASE) (HCC): ICD-10-CM

## 2023-05-01 DIAGNOSIS — Z12.5 PROSTATE CANCER SCREENING: ICD-10-CM

## 2023-05-01 DIAGNOSIS — I10 ESSENTIAL HYPERTENSION: ICD-10-CM

## 2023-05-01 PROBLEM — I37.9 PVD (PULMONARY VALVE DISEASE): Status: ACTIVE | Noted: 2023-05-01

## 2023-05-25 ENCOUNTER — HOSPITAL ENCOUNTER (OUTPATIENT)
Dept: CT IMAGING | Age: 78
Discharge: HOME | End: 2023-05-25
Attending: INTERNAL MEDICINE

## 2023-05-25 DIAGNOSIS — I10 ESSENTIAL HYPERTENSION: ICD-10-CM

## 2023-05-25 NOTE — PROGRESS NOTES
Date of Service 5/25/2023    Kristen Amado  Date of Birth 8/11/1945    Patient Age: 65year old    PCP: Darinel Dunbar MD  [address] [facility] [id number]    Consult Type  Type Scan/Screening: Heart Scan  Preliminary Heart Scan Score: 863     He denies any CP or SOB. Body Mass Index  There is no height or weight on file to calculate BMI. Lipid Profile  Cholesterol: 90, done on 1/23/2023. HDL Cholesterol: 44, done on 1/23/2023. LDL Cholesterol: 30, done on 1/23/2023. TriGlycerides 74, done on 1/23/2023. He is on statin. Nurse Review  Risk factor information and results reviewed with Nurse: Yes   /60 on med. Type II DM on Metformin  A1c 6.0    Recommended Follow Up:  Consult your physician regarding[de-identified] Final Heart Scan Report; Discuss potential for Incidental Finding    -- (Carotids and PV followed closely. Has femoral stents.)      Recommendations for Change:  Nutrition Changes: Low Saturated Fat  Cholesterol Modification (goal of therapy depends upon your risk): No Change Needed  Exercise: Enhance Current Program  Smoking Cessation: > 1 Year Ago  Weight Management: Decrease Current Weight  Stress Management: Adopt Stress Management Techniques  Repeat Heart Scan: Discuss with your Physician          Devika Recommended Resources:  Recommended Resources: Upcoming Classes, Medical Services and Health Library [email or web address]. Kiley Hoffmann RN        Please Contact the Nurse Heart Line with any Questions or Concerns [phone].

## 2023-07-25 ENCOUNTER — TELEPHONE (OUTPATIENT)
Dept: INTERNAL MEDICINE CLINIC | Facility: CLINIC | Age: 78
End: 2023-07-25

## 2023-07-25 RX ORDER — ENALAPRIL MALEATE 10 MG/1
10 TABLET ORAL DAILY
Qty: 90 TABLET | Refills: 3 | Status: SHIPPED | OUTPATIENT
Start: 2023-07-25

## 2023-07-25 NOTE — TELEPHONE ENCOUNTER
Patient accompanied spouse to office for her visit. Patient requested a refill on Enalapril. Refill request is for a maintenance medication and has met the criteria specified in the Ambulatory Medication Refill Standing Order for eligibility, visits, laboratory, alerts and was sent to the requested pharmacy. Requested Prescriptions     Signed Prescriptions Disp Refills    enalapril 10 MG Oral Tab 90 tablet 3     Sig: Take 1 tablet (10 mg total) by mouth daily.      Authorizing Provider: Osmar Kellogg     Ordering User: Amaya Rascon

## 2023-08-04 RX ORDER — ATORVASTATIN CALCIUM 20 MG/1
20 TABLET, FILM COATED ORAL NIGHTLY
Qty: 90 TABLET | Refills: 3 | Status: SHIPPED | OUTPATIENT
Start: 2023-08-04

## 2023-08-04 NOTE — TELEPHONE ENCOUNTER
Refill request is for a maintenance medication and has met the criteria specified in the Ambulatory Medication Refill Standing Order for eligibility, visits, laboratory, alerts and was sent to the requested pharmacy. Requested Prescriptions     Signed Prescriptions Disp Refills    atorvastatin 20 MG Oral Tab 90 tablet 3     Sig: Take 1 tablet (20 mg total) by mouth nightly.      Authorizing Provider: Amy Hancock     Ordering User: Mireille Fu     Appt and labs due Nov 2023

## 2023-08-09 ENCOUNTER — LAB ENCOUNTER (OUTPATIENT)
Dept: LAB | Facility: HOSPITAL | Age: 78
End: 2023-08-09
Attending: INTERNAL MEDICINE
Payer: MEDICARE

## 2023-08-09 DIAGNOSIS — Z01.818 PREOP TESTING: Primary | ICD-10-CM

## 2023-08-09 LAB
ANION GAP SERPL CALC-SCNC: 6 MMOL/L (ref 0–18)
BASOPHILS # BLD AUTO: 0.03 X10(3) UL (ref 0–0.2)
BASOPHILS NFR BLD AUTO: 0.4 %
BUN BLD-MCNC: 26 MG/DL (ref 7–18)
BUN/CREAT SERPL: 20.8 (ref 10–20)
CALCIUM BLD-MCNC: 9.3 MG/DL (ref 8.5–10.1)
CHLORIDE SERPL-SCNC: 106 MMOL/L (ref 98–112)
CO2 SERPL-SCNC: 28 MMOL/L (ref 21–32)
CREAT BLD-MCNC: 1.25 MG/DL
DEPRECATED RDW RBC AUTO: 44.6 FL (ref 35.1–46.3)
EGFRCR SERPLBLD CKD-EPI 2021: 59 ML/MIN/1.73M2 (ref 60–?)
EOSINOPHIL # BLD AUTO: 0.11 X10(3) UL (ref 0–0.7)
EOSINOPHIL NFR BLD AUTO: 1.6 %
ERYTHROCYTE [DISTWIDTH] IN BLOOD BY AUTOMATED COUNT: 13.4 % (ref 11–15)
FASTING STATUS PATIENT QL REPORTED: YES
GLUCOSE BLD-MCNC: 84 MG/DL (ref 70–99)
HCT VFR BLD AUTO: 43.8 %
HGB BLD-MCNC: 14.1 G/DL
IMM GRANULOCYTES # BLD AUTO: 0.03 X10(3) UL (ref 0–1)
IMM GRANULOCYTES NFR BLD: 0.4 %
INR BLD: 1.1 (ref 0.85–1.16)
LYMPHOCYTES # BLD AUTO: 2.7 X10(3) UL (ref 1–4)
LYMPHOCYTES NFR BLD AUTO: 38.4 %
MCH RBC QN AUTO: 29.3 PG (ref 26–34)
MCHC RBC AUTO-ENTMCNC: 32.2 G/DL (ref 31–37)
MCV RBC AUTO: 91.1 FL
MONOCYTES # BLD AUTO: 0.96 X10(3) UL (ref 0.1–1)
MONOCYTES NFR BLD AUTO: 13.7 %
NEUTROPHILS # BLD AUTO: 3.2 X10 (3) UL (ref 1.5–7.7)
NEUTROPHILS # BLD AUTO: 3.2 X10(3) UL (ref 1.5–7.7)
NEUTROPHILS NFR BLD AUTO: 45.5 %
OSMOLALITY SERPL CALC.SUM OF ELEC: 294 MOSM/KG (ref 275–295)
PLATELET # BLD AUTO: 178 10(3)UL (ref 150–450)
POTASSIUM SERPL-SCNC: 4.4 MMOL/L (ref 3.5–5.1)
PROTHROMBIN TIME: 14.1 SECONDS (ref 11.6–14.8)
RBC # BLD AUTO: 4.81 X10(6)UL
SODIUM SERPL-SCNC: 140 MMOL/L (ref 136–145)
WBC # BLD AUTO: 7 X10(3) UL (ref 4–11)

## 2023-08-09 PROCEDURE — 36415 COLL VENOUS BLD VENIPUNCTURE: CPT

## 2023-08-09 PROCEDURE — 85610 PROTHROMBIN TIME: CPT

## 2023-08-09 PROCEDURE — 85025 COMPLETE CBC W/AUTO DIFF WBC: CPT

## 2023-08-09 PROCEDURE — 80048 BASIC METABOLIC PNL TOTAL CA: CPT

## 2023-08-15 ENCOUNTER — HOSPITAL ENCOUNTER (OUTPATIENT)
Dept: INTERVENTIONAL RADIOLOGY/VASCULAR | Facility: HOSPITAL | Age: 78
Discharge: HOME | End: 2023-08-15
Attending: INTERNAL MEDICINE | Admitting: INTERNAL MEDICINE
Payer: MEDICARE

## 2023-08-15 VITALS
WEIGHT: 190 LBS | OXYGEN SATURATION: 98 % | SYSTOLIC BLOOD PRESSURE: 146 MMHG | RESPIRATION RATE: 18 BRPM | HEART RATE: 64 BPM | DIASTOLIC BLOOD PRESSURE: 71 MMHG | BODY MASS INDEX: 27.2 KG/M2 | TEMPERATURE: 97 F | HEIGHT: 70 IN

## 2023-08-15 DIAGNOSIS — R06.02 SOB (SHORTNESS OF BREATH): ICD-10-CM

## 2023-08-15 DIAGNOSIS — I20.0 ACCELERATING ANGINA (HCC): ICD-10-CM

## 2023-08-15 DIAGNOSIS — R94.39 ABNORMAL STRESS TEST: ICD-10-CM

## 2023-08-15 LAB
GLUCOSE BLDC GLUCOMTR-MCNC: 114 MG/DL (ref 70–99)
ISTAT ACTIVATED CLOTTING TIME: 239 SECONDS (ref 125–137)
ISTAT ACTIVATED CLOTTING TIME: 287 SECONDS (ref 125–137)

## 2023-08-15 PROCEDURE — 027034Z DILATION OF CORONARY ARTERY, ONE ARTERY WITH DRUG-ELUTING INTRALUMINAL DEVICE, PERCUTANEOUS APPROACH: ICD-10-PCS | Performed by: INTERNAL MEDICINE

## 2023-08-15 PROCEDURE — 99153 MOD SED SAME PHYS/QHP EA: CPT | Performed by: INTERNAL MEDICINE

## 2023-08-15 PROCEDURE — 4A023N7 MEASUREMENT OF CARDIAC SAMPLING AND PRESSURE, LEFT HEART, PERCUTANEOUS APPROACH: ICD-10-PCS | Performed by: INTERNAL MEDICINE

## 2023-08-15 PROCEDURE — 85347 COAGULATION TIME ACTIVATED: CPT

## 2023-08-15 PROCEDURE — 92921 HC PTCA EA ADDL MAJOR CORONARY ARTERY/BRANCH: CPT | Performed by: INTERNAL MEDICINE

## 2023-08-15 PROCEDURE — 02703ZZ DILATION OF CORONARY ARTERY, ONE ARTERY, PERCUTANEOUS APPROACH: ICD-10-PCS | Performed by: INTERNAL MEDICINE

## 2023-08-15 PROCEDURE — B2111ZZ FLUOROSCOPY OF MULTIPLE CORONARY ARTERIES USING LOW OSMOLAR CONTRAST: ICD-10-PCS | Performed by: INTERNAL MEDICINE

## 2023-08-15 PROCEDURE — 82962 GLUCOSE BLOOD TEST: CPT

## 2023-08-15 PROCEDURE — 93458 L HRT ARTERY/VENTRICLE ANGIO: CPT | Performed by: INTERNAL MEDICINE

## 2023-08-15 PROCEDURE — 99152 MOD SED SAME PHYS/QHP 5/>YRS: CPT | Performed by: INTERNAL MEDICINE

## 2023-08-15 RX ORDER — SODIUM CHLORIDE 9 MG/ML
3 INJECTION, SOLUTION INTRAVENOUS ONCE
Status: COMPLETED | OUTPATIENT
Start: 2023-08-15 | End: 2023-08-15

## 2023-08-15 RX ORDER — MIDAZOLAM HYDROCHLORIDE 1 MG/ML
INJECTION INTRAMUSCULAR; INTRAVENOUS
Status: DISCONTINUED
Start: 2023-08-15 | End: 2023-08-15 | Stop reason: WASHOUT

## 2023-08-15 RX ORDER — ASPIRIN 81 MG/1
81 TABLET ORAL DAILY
Status: DISCONTINUED | OUTPATIENT
Start: 2023-08-16 | End: 2023-08-15

## 2023-08-15 RX ORDER — HEPARIN SODIUM 1000 [USP'U]/ML
INJECTION, SOLUTION INTRAVENOUS; SUBCUTANEOUS
Status: COMPLETED
Start: 2023-08-15 | End: 2023-08-15

## 2023-08-15 RX ORDER — CLOPIDOGREL BISULFATE 75 MG/1
75 TABLET ORAL DAILY
Qty: 30 TABLET | Refills: 1 | Status: SHIPPED | OUTPATIENT
Start: 2023-08-15 | End: 2023-10-14

## 2023-08-15 RX ORDER — LIDOCAINE HYDROCHLORIDE 20 MG/ML
INJECTION, SOLUTION EPIDURAL; INFILTRATION; INTRACAUDAL; PERINEURAL
Status: COMPLETED
Start: 2023-08-15 | End: 2023-08-15

## 2023-08-15 RX ORDER — CLOPIDOGREL BISULFATE 75 MG/1
75 TABLET ORAL DAILY
Status: DISCONTINUED | OUTPATIENT
Start: 2023-08-16 | End: 2023-08-15

## 2023-08-15 RX ORDER — VERAPAMIL HYDROCHLORIDE 2.5 MG/ML
INJECTION, SOLUTION INTRAVENOUS
Status: COMPLETED
Start: 2023-08-15 | End: 2023-08-15

## 2023-08-15 RX ORDER — NITROGLYCERIN 20 MG/100ML
INJECTION INTRAVENOUS
Status: COMPLETED
Start: 2023-08-15 | End: 2023-08-15

## 2023-08-15 RX ORDER — MIDAZOLAM HYDROCHLORIDE 1 MG/ML
INJECTION INTRAMUSCULAR; INTRAVENOUS
Status: COMPLETED
Start: 2023-08-15 | End: 2023-08-15

## 2023-08-15 RX ORDER — ASPIRIN 81 MG/1
324 TABLET, CHEWABLE ORAL ONCE
Status: COMPLETED | OUTPATIENT
Start: 2023-08-15 | End: 2023-08-15

## 2023-08-15 RX ORDER — CLOPIDOGREL BISULFATE 75 MG/1
TABLET ORAL
Status: COMPLETED
Start: 2023-08-15 | End: 2023-08-15

## 2023-08-15 RX ORDER — SODIUM CHLORIDE 9 MG/ML
INJECTION, SOLUTION INTRAVENOUS CONTINUOUS
Status: ACTIVE | OUTPATIENT
Start: 2023-08-15 | End: 2023-08-15

## 2023-08-15 RX ADMIN — SODIUM CHLORIDE 258.6 ML: 9 INJECTION, SOLUTION INTRAVENOUS at 10:00:00

## 2023-08-15 RX ADMIN — ASPIRIN 324 MG: 81 TABLET, CHEWABLE ORAL at 10:00:00

## 2023-08-15 NOTE — IVS NOTE
DISCHARGE NOTE    1, PATIENT AWAKE AND ALERT X 4    2. TINEO, VSS, NO PONV, NO PAIN    3. INSTRUCTIONS GIVEN TO PATIENT AND FAMILY    4. IV ACCESS WAS REMOVED BEFORE DISCHARGE    5. DR. Castro       SPOKE WITH PATIENT AND FAMILY POST PROCEDURE    6. PATIENT ABLE TO eat, DRINK, AMBULATE, VOID    7. PROCEDURAL SITE REMAINS DRY, INTACT WITH GOOD CIRCULATION,    MOVEMENT AND SENSATION, wrist and groin    8. FOLLOW UP APPOINTMENT WITH gonzalo Giang 8/22/23    9. PATIENT DISCHARGED VIA WHEEL CHAIR TO main entrance    10. NEW PRESCRIPTION: Plavix, pharmacy was called ready to be picked up at this time. Placed right arm in sling and on arm board.

## 2023-08-15 NOTE — CARDIAC REHAB
Cardiac Rehab Phase I    Activity:  Distance   Assistance needed   Patient tolerated activity . Education:  Handouts provided and reviewed: [address] Diet: Healthy Cardiac diet reviewed. Disease Process: Disease process reviewed.     Reviewed the following: SITE CARE: Reviewed      RISK FACTORS: Reviewed      SMOKING CESSATION: Reviewed      HOME EXERCISE ACTIVITY: Reviewed      OUTPATIENT CARDIAC REHAB: Referred to Cardiac Rehabilitation

## 2023-08-15 NOTE — PROCEDURES
[facility]    Cardiac Cath Procedure Note    Verna Kennedy Patient Status:  Outpatient    1945 MRN [id number]   Location [facility] Attending Kasandra Whiting MD   Hosp Day # 0 PCP Chris Hutchinson. MD Jose       Cardiologist: Zuly Caballero MD  Primary Proceduralist: Zuly Caballero MD  Procedure Performed: LHC, RHC, COR, and PCI LAD  Date of Procedure: 8/15/2023   Indication: Abnormal stress test    Summary of procedure:    Successful PCI of mid LAD with CURTIS x1, kissing balloon angioplasty LAD-diagonal  Attempted radial access however due to subclavian tortuosity and calcification switch to femoral access. Recommendations:  DAPT: Aspirin and Plavix  Discharge home in 6 hours      Left Ventriculography and hemodynamics:   LV EF not done  LV EDP 12 mmHg  No gradient across aortic valve      Coronary Angiography  RCA:  Dominant and free of obstructive disease, supplies PDA and PL    Left main:  Free of obstructive disease    Left anterior descending: Focal mid LAD stenosis, 70 to 80%. Remainder of the LAD is free of obstructive disease, supplies multiple diagonals which takeoff at the mid LAD stenosis. Circumflex:  Free of obstructive disease, supplies multiple OM branches which are patent      LAD intervention  Lesion Characteristics-mildly torturous, not calcified. Type non-C lesion. Pre-intervention stenosis 70%, Post intervention stenosis 0%.   Pre ARIC 3, Post ARIC 3.      Guide Catheter: EBU 3.75  Wire: Airam blue down diagonal, Airam black down LAD  Pre-dil: None  Stent: 4 x 16 mm Synergy CURTIS at 14 sara  Post-dil: 4 x 12 mm NC balloon at 20 sara mid segment  Plaque shift into the diagonal, rewired with existing Airam black, Airam blue flipped down to the LAD  Kissing balloon inflation: 2 mm balloon and diagonal, 4 mm compliant balloon at 18 sara in the LAD        Description of Procedure:   After written informed consent was obtained from the patient, patient was brought to the cardiac catheterization laboratory. Patient was prepped and draped in the usual sterile fashion. Lidocaine 1% was used to infiltrate the right groin for local anesthesia and a 6 Mongolian introducer sheath was inserted into the right femoral artery via ultrasound guidance and micropuncture kit. 1% lidocaine infiltrated the right wrist for diagnostic angiogram using JR4 catheter for RCA and JL 3.5 catheter for LCA. 6 [place] JR4 catheter placed in LV for hemodynamics. Selective right femoral angiogram done assess anatomy for closure. Specimen sent to: No specimen collected  Estimated blood loss: 10 cc  Closure:  Perclose common femoral artery, TR band wrist      IV was maintained by RN and moderate conscious sedation of versed and fentanyl was given. Patient was assessed and monitoring of oxygen, heart rate and blood pressure by nurse and myself during the exam 35 minutes.       Ivy Jessica MD  08/15/23

## 2023-08-15 NOTE — INTERVAL H&P NOTE
Pre-op Diagnosis: * No pre-op diagnosis entered *    The above referenced H&P was reviewed by Aureliano Myles MD on 8/15/2023, the patient was examined and no significant changes have occurred in the patient's condition since the H&P was performed. I discussed with the patient and/or legal representative the potential benefits, risks and side effects of this procedure; the likelihood of the patient achieving goals; and potential problems that might occur during recuperation. I discussed reasonable alternatives to the procedure, including risks, benefits and side effects related to the alternatives and risks related to not receiving this procedure. We will proceed with procedure as planned.

## 2023-08-15 NOTE — INTERVAL H&P NOTE
Pre-op Diagnosis: * No pre-op diagnosis entered *    The above referenced H&P was reviewed by Bob Knight MD on 8/15/2023, the patient was examined and no significant changes have occurred in the patient's condition since the H&P was performed. I discussed with the patient and/or legal representative the potential benefits, risks and side effects of this procedure; the likelihood of the patient achieving goals; and potential problems that might occur during recuperation. I discussed reasonable alternatives to the procedure, including risks, benefits and side effects related to the alternatives and risks related to not receiving this procedure. We will proceed with procedure as planned.

## 2023-08-26 ENCOUNTER — TELEPHONE (OUTPATIENT)
Dept: INTERNAL MEDICINE CLINIC | Facility: CLINIC | Age: 78
End: 2023-08-26

## 2023-09-06 ENCOUNTER — TELEPHONE (OUTPATIENT)
Dept: INTERNAL MEDICINE CLINIC | Facility: CLINIC | Age: 78
End: 2023-09-06

## 2023-09-06 ENCOUNTER — LAB ENCOUNTER (OUTPATIENT)
Dept: LAB | Age: 78
End: 2023-09-06
Attending: INTERNAL MEDICINE
Payer: MEDICARE

## 2023-09-06 DIAGNOSIS — R35.0 URINARY FREQUENCY: Primary | ICD-10-CM

## 2023-09-06 DIAGNOSIS — R35.0 URINARY FREQUENCY: ICD-10-CM

## 2023-09-06 LAB
BILIRUB UR QL: NEGATIVE
CLARITY UR: CLEAR
COLOR UR: COLORLESS
GLUCOSE UR-MCNC: NORMAL MG/DL
HGB UR QL STRIP.AUTO: NEGATIVE
KETONES UR-MCNC: NEGATIVE MG/DL
LEUKOCYTE ESTERASE UR QL STRIP.AUTO: NEGATIVE
NITRITE UR QL STRIP.AUTO: NEGATIVE
PH UR: 5.5 [PH] (ref 5–8)
PROT UR-MCNC: NEGATIVE MG/DL
SP GR UR STRIP: 1.01 (ref 1–1.03)
UROBILINOGEN UR STRIP-ACNC: NORMAL

## 2023-09-06 PROCEDURE — 87086 URINE CULTURE/COLONY COUNT: CPT

## 2023-09-06 PROCEDURE — 81003 URINALYSIS AUTO W/O SCOPE: CPT

## 2023-09-06 RX ORDER — CIPROFLOXACIN 500 MG/1
500 TABLET, FILM COATED ORAL 2 TIMES DAILY
Qty: 20 TABLET | Refills: 0 | Status: SHIPPED | OUTPATIENT
Start: 2023-09-06 | End: 2023-10-25

## 2023-09-06 NOTE — TELEPHONE ENCOUNTER
UTI Symptoms:      [x]Frequency  [x]Urgency  [x]Pain/burning - just pressure  []Blood in urine  []Low back pain  []Flank pain  []Fevers  []Chills  []Night sweats  []Odor  []Confusion  []Bladder distention  [x]Feeling that bladder isn't empty after urinating  []Cloudy urine      Start of symptoms:  4 days ago    OTC meds:     Willing to get a UA/Cx:  [x]Yes   [] No    NOTES:    Called and spoke with patient. He will go to the lab this morning and provide a urine sample. Labs ordered per protocol.      To Dr. Yulissa Saldaña--

## 2023-09-06 NOTE — TELEPHONE ENCOUNTER
Refill request is for a maintenance medication and has met the criteria specified in the Ambulatory Medication Refill Standing Order for eligibility, visits, laboratory, alerts and was sent to the requested pharmacy.     Requested Prescriptions     Signed Prescriptions Disp Refills    metFORMIN 500 MG Oral Tab 90 tablet 3     Sig: Take 1 tablet (500 mg total) by mouth daily with breakfast.     Authorizing Provider: Gila Cotto User: Ariana Perez

## 2023-09-06 NOTE — TELEPHONE ENCOUNTER
Spoke to pt and relayed MD message and instructions. Pt verbalized understanding and agrees with plan. Urine culture in process.

## 2023-09-06 NOTE — TELEPHONE ENCOUNTER
Wife called stating Jaylen Tilley is having urine frequency for 4-5 days. He is urinating every hour. He has pressure in his lower abdomen. Wife thinks it's a bladder infection. Asking to be seen today by Dr. Vi Grant. Please advise.

## 2023-09-11 ENCOUNTER — TELEPHONE (OUTPATIENT)
Dept: INTERNAL MEDICINE CLINIC | Facility: CLINIC | Age: 78
End: 2023-09-11

## 2023-09-14 RX ORDER — ENALAPRIL MALEATE 10 MG/1
10 TABLET ORAL 2 TIMES DAILY
COMMUNITY
Start: 2023-09-14

## 2023-09-15 ENCOUNTER — TELEPHONE (OUTPATIENT)
Dept: SURGERY | Facility: CLINIC | Age: 78
End: 2023-09-15

## 2023-09-18 ENCOUNTER — OFFICE VISIT (OUTPATIENT)
Dept: SURGERY | Facility: CLINIC | Age: 78
End: 2023-09-18

## 2023-09-18 DIAGNOSIS — N40.1 BPH WITH OBSTRUCTION/LOWER URINARY TRACT SYMPTOMS: Primary | ICD-10-CM

## 2023-09-18 DIAGNOSIS — N13.8 BPH WITH OBSTRUCTION/LOWER URINARY TRACT SYMPTOMS: Primary | ICD-10-CM

## 2023-09-18 PROCEDURE — 1159F MED LIST DOCD IN RCRD: CPT | Performed by: UROLOGY

## 2023-09-18 PROCEDURE — 99213 OFFICE O/P EST LOW 20 MIN: CPT | Performed by: UROLOGY

## 2023-09-26 ENCOUNTER — TELEPHONE (OUTPATIENT)
Dept: SURGERY | Facility: CLINIC | Age: 78
End: 2023-09-26

## 2023-09-26 ENCOUNTER — PROCEDURE (OUTPATIENT)
Dept: SURGERY | Facility: CLINIC | Age: 78
End: 2023-09-26

## 2023-09-26 VITALS — DIASTOLIC BLOOD PRESSURE: 68 MMHG | SYSTOLIC BLOOD PRESSURE: 126 MMHG | HEART RATE: 57 BPM

## 2023-09-26 DIAGNOSIS — N40.1 BPH WITH OBSTRUCTION/LOWER URINARY TRACT SYMPTOMS: Primary | ICD-10-CM

## 2023-09-26 DIAGNOSIS — N13.8 BPH WITH OBSTRUCTION/LOWER URINARY TRACT SYMPTOMS: Primary | ICD-10-CM

## 2023-09-26 PROCEDURE — 1159F MED LIST DOCD IN RCRD: CPT | Performed by: UROLOGY

## 2023-09-26 PROCEDURE — 52000 CYSTOURETHROSCOPY: CPT | Performed by: UROLOGY

## 2023-09-26 PROCEDURE — 51798 US URINE CAPACITY MEASURE: CPT | Performed by: UROLOGY

## 2023-09-26 PROCEDURE — 3074F SYST BP LT 130 MM HG: CPT | Performed by: UROLOGY

## 2023-09-26 PROCEDURE — 51741 ELECTRO-UROFLOWMETRY FIRST: CPT | Performed by: UROLOGY

## 2023-09-26 PROCEDURE — 3078F DIAST BP <80 MM HG: CPT | Performed by: UROLOGY

## 2023-09-26 RX ORDER — CIPROFLOXACIN 500 MG/1
500 TABLET, FILM COATED ORAL ONCE
Status: COMPLETED | OUTPATIENT
Start: 2023-09-26 | End: 2023-09-26

## 2023-09-26 RX ADMIN — CIPROFLOXACIN 500 MG: 500 TABLET, FILM COATED ORAL at 10:06:00

## 2023-09-26 NOTE — TELEPHONE ENCOUNTER
Adalberto Rodriguez. This patient needs to be scheduled as follows:  Diagnosis: BPH, lower urinary tract symptoms  Procedure: Cystoscopy, greenlight laser vaporization of the prostate  Anesthesia: General  Site: [facility]  Antibiotics on-call to the operating room: Rocephin 1 g IV on-call to the OR  Preoperative labs: Urine culture  He needs preoperative cardiac clearance from Dr. Sanchez Sake his cardiologist for the surgery and to also be off of his clopidogrel and aspirin 5 days prior to surgery.

## 2023-09-26 NOTE — PROGRESS NOTES
Akin Barraza is a 77year old male. HPI:   Patient presents with:  BPH: Cystoscopy with uroflow       50-year-old with BPH, lower urinary tract symptoms refractory to maximal medical therapy with tamsulosin and dutasteride seen in the office 2023 presents for office cystoscopy, uroflow and bladder scan for postvoid residual volume. He is status post cystoscopy and TURP of the prostate in  at an outside facility. Interested in surgical therapy to address residual symptoms. He states he has been taking MiraLAX and his constipation is improved but he denies any improvement in his urination symptoms. IPSS score at last visit 26. Urinalysis and PSAs have recently been unremarkable. HISTORY:  Past Medical History:   Diagnosis Date    Arthritis     Diabetes ([address]     Esophageal reflux     High blood pressure     Hyperlipidemia     S/P TURP (transurethral resection of prostate)     Unspecified essential hypertension       Past Surgical History:   Procedure Laterality Date    ADENOIDECTOMY      As a young child    COLONOSCOPY  5 yrs ago    Done by Dr. Sofia David at [address]    HAND/FINGER SURGERY UNLISTED Right 2013    R thumb reconstructive surgery - successful    KNEE ARTHROSCOPY Right 2003    OTHER SURGICAL HISTORY  2008    Dr. Caio Beltran @ [facility]    T&A      [address] not recall. .    As a child      Family History   Problem Relation Age of Onset    Stroke Mother         /blood clot to the brain    Hypertension Mother         Birdie Pancoast (maiden name)    Cancer Sister     Cancer Father         Legal name was [facility] ([facility] for [employer])    Diabetes Maternal Grandmother     Cancer Sister         Brain cancer ()    Cancer Brother          / colan cancer      Social History:   Social History     Socioeconomic History    Marital status:    Tobacco Use    Smoking status: Former     Packs/day: 1.00     Years: 50.00 Additional pack years: 0.00     Total pack years: 50.00     Types: Cigarettes     Quit date: 6/15/2009     Years since quittin.2    Smokeless tobacco: Never   Vaping Use    Vaping Use: Never used   Substance and Sexual Activity    Alcohol use: Yes     Alcohol/week: 2.0 standard drinks of alcohol     Types: 2 Cans of beer per week     Comment: Socially and while grilling    Drug use: Never   Other Topics Concern    Caffeine Concern Yes     Comment: Coffee 3 cups daily        Medications (Active prior to today's visit):  Current Outpatient Medications   Medication Sig Dispense Refill    enalapril 10 MG Oral Tab Take 1 tablet (10 mg total) by mouth 2 (two) times daily. metFORMIN 500 MG Oral Tab Take 1 tablet (500 mg total) by mouth daily with breakfast. 90 tablet 3    clopidogrel 75 MG Oral Tab Take 1 tablet (75 mg total) by mouth daily. 30 tablet 1    atorvastatin 20 MG Oral Tab Take 1 tablet (20 mg total) by mouth nightly. 90 tablet 3    dutasteride 0.5 MG Oral Cap Take 1 capsule (0.5 mg total) by mouth daily. 90 capsule 3    tamsulosin 0.4 MG Oral Cap Take 1 capsule (0.4 mg total) by mouth daily. Glucose Blood (ONETOUCH VERIO) In Vitro Strip Use for blood sugar testing daily 100 strip 3    Blood Glucose Monitoring Suppl (ONETOUCH VERIO IQ SYSTEM) w/Device Does not apply Kit Use for blood sugar testing daily 1 kit 1    OneTouch UltraSoft Lancets Does not apply Misc Use for blood sugar testing daily 100 each 3    aspirin 81 MG Oral Tab EC Take 1 tablet (81 mg total) by mouth daily. ciprofloxacin (CIPRO) 500 MG Oral Tab Take 1 tablet (500 mg total) by mouth 2 (two) times daily. (Patient not taking: Reported on 2023) 20 tablet 0    Nutritional Supplements (VITAMIN D BOOSTER OR) Take 1 tablet by mouth daily.  (Patient not taking: Reported on 2023)         Allergies:    Bees                    SWELLING      ROS:       PHYSICAL EXAM:   Voided Volume: 137 mL    Maximum flow rate: 17.5 mL/s    Average flow rate: 7.3 mL/s    Voiding curve Shape: Intermittent    Bladder US for post void residual volume: 252 mL      Conclusion of study: Overall study demonstrates mildly diminished flow rates and incomplete bladder emptying consistent with bladder outlet obstruction    Mesha Rondon  : 1945  Referring Physician: No ref. provider found     Patient presents with:  BPH: Cystoscopy with uroflow           CYSTOSCOPY    Anesthesia:  2% lidocaine gel    Urethra: Normal  Prostate / Pelvic: Abnormal regrowth of prostatic tissue most notably anteriorly and near the apex with gross bladder outlet obstruction  Bladder: Normal.  No tumor, stone, diverticulum, or glomerulation  U.O's: Normal  Trabeculation: grade 3-4      POST CYSTOSCOPY MEDICATIONS: sample one tablet Cipro 500mg given to patient    DIAGNOSIS:     PLAN: see below         ASSESSMENT/PLAN:   Assessment   Bph with obstruction/lower urinary tract symptoms  (primary encounter diagnosis)    Reviewed findings at length with patient. Discussed options for management. We agreed to proceed based on discussions with cystoscopy, greenlight laser vaporization of the prostate. Risks side effects and possible complications were reviewed with the patient and he understands and agrees. He will need preoperative cardiac clearance and clearance to be off of his clopidogrel and low-dose aspirin for 5 days prior to surgery. Orders This Visit:  No orders of the defined types were placed in this encounter.       Meds This Visit:  Requested Prescriptions     Pending Prescriptions Disp Refills    ciprofloxacin (Cipro) tab 500 mg         Imaging & Referrals:  None     2023  Morgan Collier MD

## 2023-09-27 ENCOUNTER — TELEPHONE (OUTPATIENT)
Dept: SURGERY | Facility: CLINIC | Age: 78
End: 2023-09-27

## 2023-09-27 NOTE — TELEPHONE ENCOUNTER
Spoke with patient to schedule surgery, states that per cardiologist suggest that patient hold off on surgery until November. Patient wanted more information on green light laser, he has office visit for more questions. Patient will call back, please transfer call to [phone].

## 2023-09-28 ENCOUNTER — OFFICE VISIT (OUTPATIENT)
Dept: SURGERY | Facility: CLINIC | Age: 78
End: 2023-09-28

## 2023-09-28 DIAGNOSIS — N13.8 BPH WITH OBSTRUCTION/LOWER URINARY TRACT SYMPTOMS: Primary | ICD-10-CM

## 2023-09-28 DIAGNOSIS — N40.1 BPH WITH OBSTRUCTION/LOWER URINARY TRACT SYMPTOMS: Primary | ICD-10-CM

## 2023-09-28 PROCEDURE — 99213 OFFICE O/P EST LOW 20 MIN: CPT | Performed by: UROLOGY

## 2023-09-28 NOTE — TELEPHONE ENCOUNTER
Jacob Serra,  I saw him and his wife today. We were talking about the first or second week of December for his surgery as per this message. Please call the patient to schedule. He will need to be off of his clopidogrel 5 days prior to surgery.

## 2023-09-28 NOTE — PROGRESS NOTES
Yaritza Mckeon is a 77year old male. HPI:   Patient presents with:  BPH: Discuss surgery details. 50-year-old male status post cystoscopy, uroflow bladder scan for postvoid residual volume 2023 with some additional questions. We have discussed proceeding with cystoscopy, laser vaporization of the prostate. Here today with his wife. Was seen by cardiology and was told he has to wait until after November before he can safely come off of his clopidogrel. HISTORY:  Past Medical History:   Diagnosis Date    Arthritis     Diabetes ([address]     Esophageal reflux     High blood pressure     Hyperlipidemia     S/P TURP (transurethral resection of prostate) 2007    Unspecified essential hypertension       Past Surgical History:   Procedure Laterality Date    ADENOIDECTOMY      As a young child    COLONOSCOPY  5 yrs ago    Done by Dr. Nahid Bay at [facility]    HAND/FINGER SURGERY UNLISTED Right 2013    R thumb reconstructive surgery - successful    KNEE ARTHROSCOPY Right 2003    OTHER SURGICAL HISTORY  2008    Dr. Solomon Mail @ [facility] & HEART    T&A      [address] not recall. .    As a child      Family History   Problem Relation Age of Onset    Stroke Mother         /blood clot to the brain    Hypertension Mother         Nayana Prior (maiden name)    Cancer Sister     Cancer Father         Legal name was [facility] ([facility] for [facility])    Diabetes Maternal Grandmother     Cancer Sister         Brain cancer ()    Cancer Brother          / colan cancer      Social History:   Social History     Socioeconomic History    Marital status:    Tobacco Use    Smoking status: Former     Packs/day: 1.00     Years: 50.00     Additional pack years: 0.00     Total pack years: 50.00     Types: Cigarettes     Quit date: 6/15/2009     Years since quittin.2    Smokeless tobacco: Never   Vaping Use    Vaping Use: Never used   Substance and Sexual Activity    Alcohol use: Yes     Alcohol/week: 2.0 standard drinks of alcohol     Types: 2 Cans of beer per week     Comment: Socially and while grilling    Drug use: Never   Other Topics Concern    Caffeine Concern Yes     Comment: Coffee 3 cups daily        Medications (Active prior to today's visit):  Current Outpatient Medications   Medication Sig Dispense Refill    enalapril 10 MG Oral Tab Take 1 tablet (10 mg total) by mouth 2 (two) times daily. metFORMIN 500 MG Oral Tab Take 1 tablet (500 mg total) by mouth daily with breakfast. 90 tablet 3    ciprofloxacin (CIPRO) 500 MG Oral Tab Take 1 tablet (500 mg total) by mouth 2 (two) times daily. (Patient not taking: Reported on 9/26/2023) 20 tablet 0    clopidogrel 75 MG Oral Tab Take 1 tablet (75 mg total) by mouth daily. 30 tablet 1    atorvastatin 20 MG Oral Tab Take 1 tablet (20 mg total) by mouth nightly. 90 tablet 3    dutasteride 0.5 MG Oral Cap Take 1 capsule (0.5 mg total) by mouth daily. 90 capsule 3    tamsulosin 0.4 MG Oral Cap Take 1 capsule (0.4 mg total) by mouth daily. Glucose Blood (ONETOUCH VERIO) In Vitro Strip Use for blood sugar testing daily 100 strip 3    Blood Glucose Monitoring Suppl (ONETOUCH VERIO IQ SYSTEM) w/Device Does not apply Kit Use for blood sugar testing daily 1 kit 1    OneTouch UltraSoft Lancets Does not apply Misc Use for blood sugar testing daily 100 each 3    Nutritional Supplements (VITAMIN D BOOSTER OR) Take 1 tablet by mouth daily. (Patient not taking: Reported on 9/26/2023)      aspirin 81 MG Oral Tab EC Take 1 tablet (81 mg total) by mouth daily. Allergies:    Bees                    SWELLING      ROS:       PHYSICAL EXAM:        ASSESSMENT/PLAN:   Assessment   Bph with obstruction/lower urinary tract symptoms  (primary encounter diagnosis)    Reviewed findings at length with patient. Agreed to proceed by scheduling him for greenlight laser procedure early part of December.   I strongly urged him to start with cardiac rehab as per his cardiologist recommendations. Orders This Visit:  No orders of the defined types were placed in this encounter.       Meds This Visit:  Requested Prescriptions      No prescriptions requested or ordered in this encounter       Imaging & Referrals:  None     9/28/2023  Savage Franco MD

## 2023-10-04 RX ORDER — TAMSULOSIN HYDROCHLORIDE 0.4 MG/1
0.4 CAPSULE ORAL DAILY
Qty: 90 CAPSULE | Refills: 3 | Status: SHIPPED | OUTPATIENT
Start: 2023-10-04

## 2023-10-04 NOTE — TELEPHONE ENCOUNTER
Refill request is for a maintenance medication and has met the criteria specified in the Ambulatory Medication Refill Standing Order for eligibility, visits, laboratory, alerts and was sent to the requested pharmacy. Requested Prescriptions     Signed Prescriptions Disp Refills    tamsulosin 0.4 MG Oral Cap 90 capsule 3     Sig: Take 1 capsule (0.4 mg total) by mouth daily.      Authorizing Provider: Abdiaziz Avitia     Ordering User: Chidi Rodriguez

## 2023-10-13 ENCOUNTER — ORDER TRANSCRIPTION (OUTPATIENT)
Dept: CARDIAC REHAB | Facility: HOSPITAL | Age: 78
End: 2023-10-13

## 2023-10-13 DIAGNOSIS — Z95.820 S/P ANGIOPLASTY WITH STENT: Primary | ICD-10-CM

## 2023-10-19 ENCOUNTER — CARDPULM VISIT (OUTPATIENT)
Dept: CARDIAC REHAB | Facility: HOSPITAL | Age: 78
End: 2023-10-19
Attending: INTERNAL MEDICINE
Payer: MEDICARE

## 2023-10-19 DIAGNOSIS — Z95.820 S/P ANGIOPLASTY WITH STENT: Primary | ICD-10-CM

## 2023-10-23 ENCOUNTER — CARDPULM VISIT (OUTPATIENT)
Dept: CARDIAC REHAB | Facility: HOSPITAL | Age: 78
End: 2023-10-23
Attending: INTERNAL MEDICINE
Payer: MEDICARE

## 2023-10-23 PROCEDURE — 93798 PHYS/QHP OP CAR RHAB W/ECG: CPT

## 2023-10-25 ENCOUNTER — CARDPULM VISIT (OUTPATIENT)
Dept: CARDIAC REHAB | Facility: HOSPITAL | Age: 78
End: 2023-10-25
Attending: INTERNAL MEDICINE

## 2023-10-25 PROCEDURE — 93798 PHYS/QHP OP CAR RHAB W/ECG: CPT

## 2023-10-25 RX ORDER — BLOOD-GLUCOSE METER
EACH MISCELLANEOUS
Qty: 100 STRIP | Refills: 3 | Status: SHIPPED | OUTPATIENT
Start: 2023-10-25

## 2023-10-25 RX ORDER — LANCETS 33 GAUGE
EACH MISCELLANEOUS
Qty: 100 EACH | Refills: 3 | Status: SHIPPED | OUTPATIENT
Start: 2023-10-25

## 2023-10-25 NOTE — TELEPHONE ENCOUNTER
Refill request is for a maintenance medication and has met the criteria specified in the Ambulatory Medication Refill Standing Order for eligibility, visits, laboratory, alerts and was sent to the requested pharmacy.     Requested Prescriptions     Signed Prescriptions Disp Refills    Lancets (ONETOUCH DELICA PLUS [id number]) Does not apply Misc 100 each 3     Sig: USE FOR BLOOD SUGAR TESTING DAILY     Authorizing Provider: ALENA VALENCIA     Ordering User: JOSSY HOFFMAN    Glucose Blood (ONETOUCH VERIO) In Vitro Strip 100 strip 3     Sig: Use for blood sugar testing daily     Authorizing Provider: Daniel Holcomb     Ordering User: Pancho Whitten

## 2023-10-27 ENCOUNTER — LAB ENCOUNTER (OUTPATIENT)
Dept: LAB | Facility: HOSPITAL | Age: 78
End: 2023-10-27
Attending: INTERNAL MEDICINE

## 2023-10-27 ENCOUNTER — CARDPULM VISIT (OUTPATIENT)
Dept: CARDIAC REHAB | Facility: HOSPITAL | Age: 78
End: 2023-10-27
Attending: INTERNAL MEDICINE

## 2023-10-27 DIAGNOSIS — E78.00 HYPERCHOLESTEREMIA: ICD-10-CM

## 2023-10-27 DIAGNOSIS — Z12.5 PROSTATE CANCER SCREENING: ICD-10-CM

## 2023-10-27 LAB
ALBUMIN SERPL-MCNC: 4 G/DL (ref 3.4–5)
ALBUMIN/GLOB SERPL: 1.1 {RATIO} (ref 1–2)
ALP LIVER SERPL-CCNC: 63 U/L
ALT SERPL-CCNC: 33 U/L
ANION GAP SERPL CALC-SCNC: 5 MMOL/L (ref 0–18)
AST SERPL-CCNC: 25 U/L (ref 15–37)
BASOPHILS # BLD AUTO: 0.03 X10(3) UL (ref 0–0.2)
BASOPHILS NFR BLD AUTO: 0.5 %
BILIRUB SERPL-MCNC: 0.8 MG/DL (ref 0.1–2)
BUN BLD-MCNC: 19 MG/DL (ref 7–18)
BUN/CREAT SERPL: 13.6 (ref 10–20)
CALCIUM BLD-MCNC: 8.8 MG/DL (ref 8.5–10.1)
CHLORIDE SERPL-SCNC: 108 MMOL/L (ref 98–112)
CHOLEST SERPL-MCNC: 80 MG/DL (ref ?–200)
CO2 SERPL-SCNC: 28 MMOL/L (ref 21–32)
COMPLEXED PSA SERPL-MCNC: 0.28 NG/ML (ref ?–4)
CREAT BLD-MCNC: 1.4 MG/DL
DEPRECATED RDW RBC AUTO: 44.3 FL (ref 35.1–46.3)
EGFRCR SERPLBLD CKD-EPI 2021: 51 ML/MIN/1.73M2 (ref 60–?)
EOSINOPHIL # BLD AUTO: 0.1 X10(3) UL (ref 0–0.7)
EOSINOPHIL NFR BLD AUTO: 1.6 %
ERYTHROCYTE [DISTWIDTH] IN BLOOD BY AUTOMATED COUNT: 13.3 % (ref 11–15)
EST. AVERAGE GLUCOSE BLD GHB EST-MCNC: 117 MG/DL (ref 68–126)
FASTING PATIENT LIPID ANSWER: YES
FASTING STATUS PATIENT QL REPORTED: YES
GLOBULIN PLAS-MCNC: 3.7 G/DL (ref 2.8–4.4)
GLUCOSE BLD-MCNC: 95 MG/DL (ref 70–99)
HBA1C MFR BLD: 5.7 % (ref ?–5.7)
HCT VFR BLD AUTO: 43.2 %
HDLC SERPL-MCNC: 38 MG/DL (ref 40–59)
HGB BLD-MCNC: 14.1 G/DL
IMM GRANULOCYTES # BLD AUTO: 0.01 X10(3) UL (ref 0–1)
IMM GRANULOCYTES NFR BLD: 0.2 %
LDLC SERPL CALC-MCNC: 25 MG/DL (ref ?–100)
LYMPHOCYTES # BLD AUTO: 2.07 X10(3) UL (ref 1–4)
LYMPHOCYTES NFR BLD AUTO: 33 %
MCH RBC QN AUTO: 29.6 PG (ref 26–34)
MCHC RBC AUTO-ENTMCNC: 32.6 G/DL (ref 31–37)
MCV RBC AUTO: 90.6 FL
MONOCYTES # BLD AUTO: 0.68 X10(3) UL (ref 0.1–1)
MONOCYTES NFR BLD AUTO: 10.8 %
NEUTROPHILS # BLD AUTO: 3.38 X10 (3) UL (ref 1.5–7.7)
NEUTROPHILS # BLD AUTO: 3.38 X10(3) UL (ref 1.5–7.7)
NEUTROPHILS NFR BLD AUTO: 53.9 %
NONHDLC SERPL-MCNC: 42 MG/DL (ref ?–130)
OSMOLALITY SERPL CALC.SUM OF ELEC: 294 MOSM/KG (ref 275–295)
PLATELET # BLD AUTO: 183 10(3)UL (ref 150–450)
POTASSIUM SERPL-SCNC: 4.3 MMOL/L (ref 3.5–5.1)
PROT SERPL-MCNC: 7.7 G/DL (ref 6.4–8.2)
RBC # BLD AUTO: 4.77 X10(6)UL
SODIUM SERPL-SCNC: 141 MMOL/L (ref 136–145)
TRIGL SERPL-MCNC: 82 MG/DL (ref 30–149)
VLDLC SERPL CALC-MCNC: 11 MG/DL (ref 0–30)
WBC # BLD AUTO: 6.3 X10(3) UL (ref 4–11)

## 2023-10-27 PROCEDURE — 85025 COMPLETE CBC W/AUTO DIFF WBC: CPT

## 2023-10-27 PROCEDURE — 80053 COMPREHEN METABOLIC PANEL: CPT

## 2023-10-27 PROCEDURE — 36415 COLL VENOUS BLD VENIPUNCTURE: CPT

## 2023-10-27 PROCEDURE — 83036 HEMOGLOBIN GLYCOSYLATED A1C: CPT

## 2023-10-27 PROCEDURE — 80061 LIPID PANEL: CPT

## 2023-10-27 PROCEDURE — 93798 PHYS/QHP OP CAR RHAB W/ECG: CPT

## 2023-10-30 ENCOUNTER — CARDPULM VISIT (OUTPATIENT)
Dept: CARDIAC REHAB | Facility: HOSPITAL | Age: 78
End: 2023-10-30
Attending: INTERNAL MEDICINE

## 2023-10-30 PROCEDURE — 93798 PHYS/QHP OP CAR RHAB W/ECG: CPT

## 2023-11-01 ENCOUNTER — CARDPULM VISIT (OUTPATIENT)
Dept: CARDIAC REHAB | Facility: HOSPITAL | Age: 78
End: 2023-11-01
Attending: INTERNAL MEDICINE
Payer: MEDICARE

## 2023-11-01 ENCOUNTER — OFFICE VISIT (OUTPATIENT)
Dept: INTERNAL MEDICINE CLINIC | Facility: CLINIC | Age: 78
End: 2023-11-01
Payer: COMMERCIAL

## 2023-11-01 VITALS
SYSTOLIC BLOOD PRESSURE: 130 MMHG | DIASTOLIC BLOOD PRESSURE: 72 MMHG | WEIGHT: 191.81 LBS | HEART RATE: 70 BPM | BODY MASS INDEX: 27.46 KG/M2 | TEMPERATURE: 99 F | HEIGHT: 70 IN | OXYGEN SATURATION: 97 %

## 2023-11-01 DIAGNOSIS — N40.0 PROSTATISM: ICD-10-CM

## 2023-11-01 DIAGNOSIS — I10 ESSENTIAL HYPERTENSION: ICD-10-CM

## 2023-11-01 DIAGNOSIS — E78.00 HYPERCHOLESTEREMIA: ICD-10-CM

## 2023-11-01 DIAGNOSIS — I73.9 PVD (PERIPHERAL VASCULAR DISEASE) (HCC): Primary | ICD-10-CM

## 2023-11-01 PROCEDURE — 3075F SYST BP GE 130 - 139MM HG: CPT | Performed by: INTERNAL MEDICINE

## 2023-11-01 PROCEDURE — 1170F FXNL STATUS ASSESSED: CPT | Performed by: INTERNAL MEDICINE

## 2023-11-01 PROCEDURE — 99214 OFFICE O/P EST MOD 30 MIN: CPT | Performed by: INTERNAL MEDICINE

## 2023-11-01 PROCEDURE — 93798 PHYS/QHP OP CAR RHAB W/ECG: CPT

## 2023-11-01 PROCEDURE — 1126F AMNT PAIN NOTED NONE PRSNT: CPT | Performed by: INTERNAL MEDICINE

## 2023-11-01 PROCEDURE — 3078F DIAST BP <80 MM HG: CPT | Performed by: INTERNAL MEDICINE

## 2023-11-01 PROCEDURE — 1159F MED LIST DOCD IN RCRD: CPT | Performed by: INTERNAL MEDICINE

## 2023-11-01 PROCEDURE — 3008F BODY MASS INDEX DOCD: CPT | Performed by: INTERNAL MEDICINE

## 2023-11-01 RX ORDER — CLOPIDOGREL BISULFATE 75 MG/1
75 TABLET ORAL DAILY
COMMUNITY

## 2023-11-03 ENCOUNTER — CARDPULM VISIT (OUTPATIENT)
Dept: CARDIAC REHAB | Facility: HOSPITAL | Age: 78
End: 2023-11-03
Attending: INTERNAL MEDICINE
Payer: MEDICARE

## 2023-11-03 PROCEDURE — 93798 PHYS/QHP OP CAR RHAB W/ECG: CPT

## 2023-11-06 ENCOUNTER — CARDPULM VISIT (OUTPATIENT)
Dept: CARDIAC REHAB | Facility: HOSPITAL | Age: 78
End: 2023-11-06
Attending: INTERNAL MEDICINE
Payer: MEDICARE

## 2023-11-06 PROCEDURE — 93798 PHYS/QHP OP CAR RHAB W/ECG: CPT

## 2023-11-08 ENCOUNTER — CARDPULM VISIT (OUTPATIENT)
Dept: CARDIAC REHAB | Facility: HOSPITAL | Age: 78
End: 2023-11-08
Attending: INTERNAL MEDICINE
Payer: MEDICARE

## 2023-11-08 PROCEDURE — 93798 PHYS/QHP OP CAR RHAB W/ECG: CPT

## 2023-11-10 ENCOUNTER — CARDPULM VISIT (OUTPATIENT)
Dept: CARDIAC REHAB | Facility: HOSPITAL | Age: 78
End: 2023-11-10
Attending: INTERNAL MEDICINE
Payer: MEDICARE

## 2023-11-10 PROCEDURE — 93798 PHYS/QHP OP CAR RHAB W/ECG: CPT

## 2023-11-13 ENCOUNTER — CARDPULM VISIT (OUTPATIENT)
Dept: CARDIAC REHAB | Facility: HOSPITAL | Age: 78
End: 2023-11-13
Attending: INTERNAL MEDICINE
Payer: MEDICARE

## 2023-11-13 PROCEDURE — 93798 PHYS/QHP OP CAR RHAB W/ECG: CPT

## 2023-11-15 ENCOUNTER — CARDPULM VISIT (OUTPATIENT)
Dept: CARDIAC REHAB | Facility: HOSPITAL | Age: 78
End: 2023-11-15
Attending: INTERNAL MEDICINE
Payer: MEDICARE

## 2023-11-15 PROCEDURE — 93798 PHYS/QHP OP CAR RHAB W/ECG: CPT

## 2023-11-16 LAB — AMB EXT COVID-19 RESULT: DETECTED

## 2023-11-17 ENCOUNTER — TELEPHONE (OUTPATIENT)
Dept: INTERNAL MEDICINE CLINIC | Facility: CLINIC | Age: 78
End: 2023-11-17

## 2023-11-17 NOTE — TELEPHONE ENCOUNTER
Respiratory infection triage:    Fever:  [x]  No fever  []  Fever>100.4    Cough:  [] Tight cough  [] Cough with exertion  [x] Dry cough  [] Sputum production, Color:     Breathing:  [] Mild shortness of breath interfering with activity  [] Wheezing  [] Pain with deep breathing  [] Using inhaler    Other symptoms:  [] Sore throat  [] Difficulty swallowing  [x] Nasal drainage/congestion  [] Sinus congestion/pressure  [] Ear pain  [] Body aches  [] Poor appetite  [] Loss of sense of smell   [] Loss of sense of taste  []Conjunctivitis? [] Any recent travel? [] Any sick contacts? [] Are you a healthcare worker? ADDITIONAL NOTES:  Please advise - called patient who started with symptoms Wednesday- tested positive last night . He wants to know if he should continue Aspirin, also he will have surgery 12/13/23 , also can he get paxlovid - if so he will be using [facility] in [facility] - to DR. JENKINS          Notified patient that we will route this message to the doctor and see what their recommendations would be. In the meantime, if anything worsens, they were advised to call back or seek emergent evaluation.

## 2023-11-17 NOTE — TELEPHONE ENCOUNTER
Please call patient spouse  Patient tested positive for COVID with a home test  Patient has head cold   Should patient continue with baby asperin  Does patient need RX for Paxlovid? Patient scheduled for surgery on 12/13/23, will this be ok?   Tasked to nursing

## 2023-11-17 NOTE — TELEPHONE ENCOUNTER
Usual COVID precautions and recommendations. Patient is on atorvastatin and clopidogrel which can have interactions with Paxlovid--therefore I would not recommend that we start Paxlovid especially since patient's symptoms are relatively mild. Continue with symptomatic treatment. May need to delay surgery secondary to recent [place] infection--patient should discuss this with his surgeon.

## 2023-11-20 ENCOUNTER — TELEPHONE (OUTPATIENT)
Dept: SURGERY | Facility: CLINIC | Age: 78
End: 2023-11-20

## 2023-11-20 ENCOUNTER — APPOINTMENT (OUTPATIENT)
Dept: CARDIAC REHAB | Facility: HOSPITAL | Age: 78
End: 2023-11-20
Attending: INTERNAL MEDICINE
Payer: MEDICARE

## 2023-11-20 NOTE — TELEPHONE ENCOUNTER
Pt called stating pt is scheduled for surgery on 12-13-23. Pt has tested positive for covid on 11-15-23. Any precautions to take prior to surgery.   Please call

## 2023-11-21 NOTE — TELEPHONE ENCOUNTER
Patient contacted and was told that I would give him the number to Pre-adm to give him the protocols and review what anesthesia protocols would be.

## 2023-11-22 ENCOUNTER — APPOINTMENT (OUTPATIENT)
Dept: CARDIAC REHAB | Facility: HOSPITAL | Age: 78
End: 2023-11-22
Attending: INTERNAL MEDICINE
Payer: MEDICARE

## 2023-11-24 ENCOUNTER — APPOINTMENT (OUTPATIENT)
Dept: CARDIAC REHAB | Facility: HOSPITAL | Age: 78
End: 2023-11-24
Attending: INTERNAL MEDICINE
Payer: MEDICARE

## 2023-11-27 ENCOUNTER — CARDPULM VISIT (OUTPATIENT)
Dept: CARDIAC REHAB | Facility: HOSPITAL | Age: 78
End: 2023-11-27
Attending: INTERNAL MEDICINE
Payer: MEDICARE

## 2023-11-27 PROCEDURE — 93798 PHYS/QHP OP CAR RHAB W/ECG: CPT

## 2023-11-28 NOTE — PAT NURSING NOTE
Patient called and stated he had covid on 11/13/23. His symptoms were nasal congestion.   He feels fine now with no symptoms

## 2023-11-29 ENCOUNTER — CARDPULM VISIT (OUTPATIENT)
Dept: CARDIAC REHAB | Facility: HOSPITAL | Age: 78
End: 2023-11-29
Attending: INTERNAL MEDICINE
Payer: MEDICARE

## 2023-11-29 PROCEDURE — 93798 PHYS/QHP OP CAR RHAB W/ECG: CPT

## 2023-12-01 ENCOUNTER — CARDPULM VISIT (OUTPATIENT)
Dept: CARDIAC REHAB | Facility: HOSPITAL | Age: 78
End: 2023-12-01
Attending: INTERNAL MEDICINE
Payer: MEDICARE

## 2023-12-01 PROCEDURE — 93798 PHYS/QHP OP CAR RHAB W/ECG: CPT

## 2023-12-04 ENCOUNTER — CARDPULM VISIT (OUTPATIENT)
Dept: CARDIAC REHAB | Facility: HOSPITAL | Age: 78
End: 2023-12-04
Attending: INTERNAL MEDICINE
Payer: MEDICARE

## 2023-12-04 PROCEDURE — 93798 PHYS/QHP OP CAR RHAB W/ECG: CPT

## 2023-12-06 ENCOUNTER — LAB ENCOUNTER (OUTPATIENT)
Dept: LAB | Facility: HOSPITAL | Age: 78
End: 2023-12-06
Attending: UROLOGY
Payer: MEDICARE

## 2023-12-06 ENCOUNTER — CARDPULM VISIT (OUTPATIENT)
Dept: CARDIAC REHAB | Facility: HOSPITAL | Age: 78
End: 2023-12-06
Attending: INTERNAL MEDICINE
Payer: MEDICARE

## 2023-12-06 PROCEDURE — 87086 URINE CULTURE/COLONY COUNT: CPT | Performed by: UROLOGY

## 2023-12-06 PROCEDURE — 93798 PHYS/QHP OP CAR RHAB W/ECG: CPT

## 2023-12-08 ENCOUNTER — CARDPULM VISIT (OUTPATIENT)
Dept: CARDIAC REHAB | Facility: HOSPITAL | Age: 78
End: 2023-12-08
Attending: INTERNAL MEDICINE
Payer: MEDICARE

## 2023-12-08 PROCEDURE — 93798 PHYS/QHP OP CAR RHAB W/ECG: CPT

## 2023-12-11 ENCOUNTER — CARDPULM VISIT (OUTPATIENT)
Dept: CARDIAC REHAB | Facility: HOSPITAL | Age: 78
End: 2023-12-11
Attending: INTERNAL MEDICINE
Payer: MEDICARE

## 2023-12-11 PROCEDURE — 93798 PHYS/QHP OP CAR RHAB W/ECG: CPT

## 2023-12-13 ENCOUNTER — ANESTHESIA (OUTPATIENT)
Dept: SURGERY | Facility: HOSPITAL | Age: 78
End: 2023-12-13
Payer: MEDICARE

## 2023-12-13 ENCOUNTER — HOSPITAL ENCOUNTER (OUTPATIENT)
Facility: HOSPITAL | Age: 78
Setting detail: HOSPITAL OUTPATIENT SURGERY
Discharge: HOME | End: 2023-12-13
Attending: UROLOGY | Admitting: UROLOGY
Payer: MEDICARE

## 2023-12-13 ENCOUNTER — APPOINTMENT (OUTPATIENT)
Dept: CARDIAC REHAB | Facility: HOSPITAL | Age: 78
End: 2023-12-13
Attending: INTERNAL MEDICINE
Payer: MEDICARE

## 2023-12-13 ENCOUNTER — ANESTHESIA EVENT (OUTPATIENT)
Dept: SURGERY | Facility: HOSPITAL | Age: 78
End: 2023-12-13
Payer: MEDICARE

## 2023-12-13 VITALS
HEIGHT: 68.5 IN | RESPIRATION RATE: 13 BRPM | WEIGHT: 185 LBS | OXYGEN SATURATION: 99 % | BODY MASS INDEX: 27.72 KG/M2 | HEART RATE: 51 BPM | TEMPERATURE: 98 F | DIASTOLIC BLOOD PRESSURE: 70 MMHG | SYSTOLIC BLOOD PRESSURE: 164 MMHG

## 2023-12-13 LAB — GLUCOSE BLDC GLUCOMTR-MCNC: 99 MG/DL (ref 70–99)

## 2023-12-13 PROCEDURE — 52648 LASER SURGERY OF PROSTATE: CPT | Performed by: UROLOGY

## 2023-12-13 PROCEDURE — 0V507ZZ DESTRUCTION OF PROSTATE, VIA NATURAL OR ARTIFICIAL OPENING: ICD-10-PCS | Performed by: UROLOGY

## 2023-12-13 RX ORDER — INSULIN ASPART 100 [IU]/ML
INJECTION, SOLUTION INTRAVENOUS; SUBCUTANEOUS ONCE
Status: DISCONTINUED | OUTPATIENT
Start: 2023-12-13 | End: 2023-12-13

## 2023-12-13 RX ORDER — MORPHINE SULFATE 4 MG/ML
2 INJECTION, SOLUTION INTRAMUSCULAR; INTRAVENOUS EVERY 10 MIN PRN
Status: DISCONTINUED | OUTPATIENT
Start: 2023-12-13 | End: 2023-12-13

## 2023-12-13 RX ORDER — SODIUM CHLORIDE, SODIUM LACTATE, POTASSIUM CHLORIDE, CALCIUM CHLORIDE 600; 310; 30; 20 MG/100ML; MG/100ML; MG/100ML; MG/100ML
INJECTION, SOLUTION INTRAVENOUS CONTINUOUS
Status: DISCONTINUED | OUTPATIENT
Start: 2023-12-13 | End: 2023-12-13

## 2023-12-13 RX ORDER — PHENAZOPYRIDINE HYDROCHLORIDE 200 MG/1
200 TABLET, FILM COATED ORAL ONCE
Status: COMPLETED | OUTPATIENT
Start: 2023-12-13 | End: 2023-12-13

## 2023-12-13 RX ORDER — MORPHINE SULFATE 4 MG/ML
4 INJECTION, SOLUTION INTRAMUSCULAR; INTRAVENOUS EVERY 10 MIN PRN
Status: DISCONTINUED | OUTPATIENT
Start: 2023-12-13 | End: 2023-12-13

## 2023-12-13 RX ORDER — PHENYLEPHRINE HCL 10 MG/ML
VIAL (ML) INJECTION AS NEEDED
Status: DISCONTINUED | OUTPATIENT
Start: 2023-12-13 | End: 2023-12-13 | Stop reason: SURG

## 2023-12-13 RX ORDER — CEFADROXIL 500 MG/1
500 CAPSULE ORAL 2 TIMES DAILY
Qty: 6 CAPSULE | Refills: 0 | Status: SHIPPED | OUTPATIENT
Start: 2023-12-14 | End: 2023-12-17

## 2023-12-13 RX ORDER — NALOXONE HYDROCHLORIDE 0.4 MG/ML
0.08 INJECTION, SOLUTION INTRAMUSCULAR; INTRAVENOUS; SUBCUTANEOUS AS NEEDED
Status: DISCONTINUED | OUTPATIENT
Start: 2023-12-13 | End: 2023-12-13

## 2023-12-13 RX ORDER — NICOTINE POLACRILEX 4 MG
30 LOZENGE BUCCAL
Status: DISCONTINUED | OUTPATIENT
Start: 2023-12-13 | End: 2023-12-13

## 2023-12-13 RX ORDER — ONDANSETRON 2 MG/ML
4 INJECTION INTRAMUSCULAR; INTRAVENOUS EVERY 6 HOURS PRN
Status: DISCONTINUED | OUTPATIENT
Start: 2023-12-13 | End: 2023-12-13

## 2023-12-13 RX ORDER — NICOTINE POLACRILEX 4 MG
15 LOZENGE BUCCAL
Status: DISCONTINUED | OUTPATIENT
Start: 2023-12-13 | End: 2023-12-13

## 2023-12-13 RX ORDER — ONDANSETRON 2 MG/ML
INJECTION INTRAMUSCULAR; INTRAVENOUS AS NEEDED
Status: DISCONTINUED | OUTPATIENT
Start: 2023-12-13 | End: 2023-12-13 | Stop reason: SURG

## 2023-12-13 RX ORDER — HYDROMORPHONE HYDROCHLORIDE 1 MG/ML
0.4 INJECTION, SOLUTION INTRAMUSCULAR; INTRAVENOUS; SUBCUTANEOUS EVERY 5 MIN PRN
Status: DISCONTINUED | OUTPATIENT
Start: 2023-12-13 | End: 2023-12-13

## 2023-12-13 RX ORDER — ACETAMINOPHEN 500 MG
1000 TABLET ORAL ONCE
Status: COMPLETED | OUTPATIENT
Start: 2023-12-13 | End: 2023-12-13

## 2023-12-13 RX ORDER — HYDROMORPHONE HYDROCHLORIDE 1 MG/ML
0.2 INJECTION, SOLUTION INTRAMUSCULAR; INTRAVENOUS; SUBCUTANEOUS EVERY 5 MIN PRN
Status: DISCONTINUED | OUTPATIENT
Start: 2023-12-13 | End: 2023-12-13

## 2023-12-13 RX ORDER — LIDOCAINE HYDROCHLORIDE 10 MG/ML
INJECTION, SOLUTION EPIDURAL; INFILTRATION; INTRACAUDAL; PERINEURAL AS NEEDED
Status: DISCONTINUED | OUTPATIENT
Start: 2023-12-13 | End: 2023-12-13 | Stop reason: SURG

## 2023-12-13 RX ORDER — DEXTROSE MONOHYDRATE 25 G/50ML
50 INJECTION, SOLUTION INTRAVENOUS
Status: DISCONTINUED | OUTPATIENT
Start: 2023-12-13 | End: 2023-12-13

## 2023-12-13 RX ORDER — DEXAMETHASONE SODIUM PHOSPHATE 4 MG/ML
VIAL (ML) INJECTION AS NEEDED
Status: DISCONTINUED | OUTPATIENT
Start: 2023-12-13 | End: 2023-12-13 | Stop reason: SURG

## 2023-12-13 RX ORDER — METOCLOPRAMIDE HYDROCHLORIDE 5 MG/ML
10 INJECTION INTRAMUSCULAR; INTRAVENOUS EVERY 8 HOURS PRN
Status: DISCONTINUED | OUTPATIENT
Start: 2023-12-13 | End: 2023-12-13

## 2023-12-13 RX ORDER — SODIUM CHLORIDE 9 MG/ML
INJECTION, SOLUTION INTRAVENOUS CONTINUOUS
Status: DISCONTINUED | OUTPATIENT
Start: 2023-12-13 | End: 2023-12-13

## 2023-12-13 RX ADMIN — PHENYLEPHRINE HCL 100 MCG: 10 MG/ML VIAL (ML) INJECTION at 09:23:00

## 2023-12-13 RX ADMIN — PHENYLEPHRINE HCL 100 MCG: 10 MG/ML VIAL (ML) INJECTION at 09:25:00

## 2023-12-13 RX ADMIN — SODIUM CHLORIDE: 9 INJECTION, SOLUTION INTRAVENOUS at 09:38:00

## 2023-12-13 RX ADMIN — ONDANSETRON 4 MG: 2 INJECTION INTRAMUSCULAR; INTRAVENOUS at 09:37:00

## 2023-12-13 RX ADMIN — LIDOCAINE HYDROCHLORIDE 50 MG: 10 INJECTION, SOLUTION EPIDURAL; INFILTRATION; INTRACAUDAL; PERINEURAL at 09:12:00

## 2023-12-13 RX ADMIN — DEXAMETHASONE SODIUM PHOSPHATE 4 MG: 4 MG/ML VIAL (ML) INJECTION at 09:26:00

## 2023-12-13 RX ADMIN — SODIUM CHLORIDE: 9 INJECTION, SOLUTION INTRAVENOUS at 09:26:00

## 2023-12-13 NOTE — H&P
HPI:   Patient presents with:  BPH: Discuss surgery details. 34-year-old male status post cystoscopy, uroflow bladder scan for postvoid residual volume 2023 with some additional questions. We have discussed proceeding with cystoscopy, laser vaporization of the prostate. Here today with his wife. Was seen by cardiology and was told he has to wait until after November before he can safely come off of his clopidogrel. HISTORY:       Past Medical History:   Diagnosis Date    Arthritis      Diabetes ([address]      Esophageal reflux      High blood pressure      Hyperlipidemia      S/P TURP (transurethral resection of prostate)     Unspecified essential hypertension              Past Surgical History:   Procedure Laterality Date    ADENOIDECTOMY         As a young child    COLONOSCOPY   5 yrs ago     Done by Dr. Raji Metcalf at [facility]    HAND/FINGER SURGERY UNLISTED Right 2013     R thumb reconstructive surgery - successful    KNEE ARTHROSCOPY Right 2003    OTHER SURGICAL HISTORY   2008     Dr. Angela García @ [facility] & HEART    T&A        [facility] not recall. .     As a child            Family History   Problem Relation Age of Onset    Stroke Mother           /blood clot to the brain    Hypertension Mother           Tonia Saint (maiden name)    Cancer Sister      Cancer Father           Legal name was Abundio ([facility] for [employer])    Diabetes Maternal Grandmother      Cancer Sister           Brain cancer ()    Cancer Brother            / colan cancer      Social History:   Social History            Socioeconomic History    Marital status:    Tobacco Use    Smoking status: Former       Packs/day: 1.00       Years: 50.00       Additional pack years: 0.00       Total pack years: 50.00       Types: Cigarettes       Quit date: 6/15/2009       Years since quittin.2    Smokeless tobacco: Never   Vaping Use    Vaping Use: Never used Substance and Sexual Activity    Alcohol use: Yes       Alcohol/week: 2.0 standard drinks of alcohol       Types: 2 Cans of beer per week       Comment: Socially and while grilling    Drug use: Never   Other Topics Concern    Caffeine Concern Yes       Comment: Coffee 3 cups daily         Medications (Active prior to today's visit):         Current Outpatient Medications   Medication Sig Dispense Refill    enalapril 10 MG Oral Tab Take 1 tablet (10 mg total) by mouth 2 (two) times daily. metFORMIN 500 MG Oral Tab Take 1 tablet (500 mg total) by mouth daily with breakfast. 90 tablet 3    ciprofloxacin (CIPRO) 500 MG Oral Tab Take 1 tablet (500 mg total) by mouth 2 (two) times daily. (Patient not taking: Reported on 9/26/2023) 20 tablet 0    clopidogrel 75 MG Oral Tab Take 1 tablet (75 mg total) by mouth daily. 30 tablet 1    atorvastatin 20 MG Oral Tab Take 1 tablet (20 mg total) by mouth nightly. 90 tablet 3    dutasteride 0.5 MG Oral Cap Take 1 capsule (0.5 mg total) by mouth daily. 90 capsule 3    tamsulosin 0.4 MG Oral Cap Take 1 capsule (0.4 mg total) by mouth daily. Glucose Blood (ONETOUCH VERIO) In Vitro Strip Use for blood sugar testing daily 100 strip 3    Blood Glucose Monitoring Suppl (ONETOUCH VERIO IQ SYSTEM) w/Device Does not apply Kit Use for blood sugar testing daily 1 kit 1    OneTouch UltraSoft Lancets Does not apply Misc Use for blood sugar testing daily 100 each 3    Nutritional Supplements (VITAMIN D BOOSTER OR) Take 1 tablet by mouth daily. (Patient not taking: Reported on 9/26/2023)        aspirin 81 MG Oral Tab EC Take 1 tablet (81 mg total) by mouth daily.              Allergies:     Bees                    SWELLING        ROS:   Reviewed and otherwise normal     PHYSICAL EXAM:   No apparent distress  Intact neurologically grossly  Abdomen soft nontender nondistended  Phallus and testicle exams both unremarkable  Alert and oriented x 3     ASSESSMENT/PLAN:      Assessment  Bph with obstruction/lower urinary tract symptoms  (primary encounter diagnosis)     Reviewed findings at length with patient. Agreed to proceed by scheduling him for greenlight laser procedure early part of December. I strongly urged him to start with cardiac rehab as per his cardiologist recommendations. Orders This Visit:  No orders of the defined types were placed in this encounter.         Meds This Visit:  Requested Prescriptions        No prescriptions requested or ordered in this encounter         Imaging & Referrals:  None      9/28/2023  Kingsley Kay MD

## 2023-12-13 NOTE — OPERATIVE REPORT
[facility]    Operative Note     Vivian Mena Location: OR   [facility] [id number] MRN [id number]   Admission Date 12/13/2023 Operation Date 12/13/2023   Attending Physician Emily Mike MD Operating Physician Savage Franco MD      Preoperative Diagnosis: BPH with obstruction/lower urinary tract symptoms [N40.1, N13.8]     Postoperative Diagnosis: BPH with obstruction/lower urinary tract symptoms [N40.1, N13.8]     Procedure Performed:   Cystoscopy green light laser vaporization of the prostate     Primary Surgeon: Savage Franco MD      Assistant: None     Surgical Findings: Mostly anterior obstructing prostatic tissue vaporized, grade 2-3 bladder wall trabeculation, grossly normal ureter orifice bilaterally     Anesthesia: General     Complications: None     Implants: * No implants in log *     Specimen: None     Drains: 20 Malian three-way Vogt catheter with a 30 cc balloon     Condition: Stable     Estimated Blood Loss: No data recorded     Summary of Case: After consent was obtained and preoperative antibiotics administered the patient was brought into the operating room. Anesthesia was administered and he was placed in the dorsolithotomy position. The groin was prepped and draped in the usual sterile standard fashion. 21 [place]ian continuous-flow laser scope was then placed per urethra. Findings are reported in the findings section of this report. The XPS greenlight laser was inserted. Most of the prostatic residual tissue was anteriorly and in the high lateral areas on both sides. A total of [id number] J were used to vaporize this tissue. A total vaporization time of 5 minutes and 12 seconds was used. A nice channel was then created through the prostatic urethra. No evidence of bleeding was seen. Cystoscope was removed. At 21 Malian three-way Vogt catheter with a 30 cc balloon was then placed per urethra.   The patient was placed in the supine position awakened and taken out to the postanesthesia care unit in stable condition. I was present and performed the entirety of this procedure. There were no perioperative or immediate postop complications.      Mary Barnes MD  12/13/2023  9:48 AM

## 2023-12-13 NOTE — INTERVAL H&P NOTE
Pre-op Diagnosis: BPH with obstruction/lower urinary tract symptoms [N40.1, N13.8]    The above referenced H&P was reviewed by Patrizia Rodriguez MD on 12/13/2023, the patient was examined and no significant changes have occurred in the patient's condition since the H&P was performed. I discussed with the patient and/or legal representative the potential benefits, risks and side effects of this procedure; the likelihood of the patient achieving goals; and potential problems that might occur during recuperation. I discussed reasonable alternatives to the procedure, including risks, benefits and side effects related to the alternatives and risks related to not receiving this procedure. We will proceed with procedure as planned.

## 2023-12-13 NOTE — ANESTHESIA PROCEDURE NOTES
Airway  Date/Time: 12/13/2023 9:15 AM  Urgency: elective    Airway not difficult    General Information and Staff    Patient location during procedure: OR  Anesthesiologist: Camila Christensen MD  Performed: anesthesiologist   Performed by: Camila Christensen MD  Authorized by: Camila Christensen MD      Indications and Patient Condition  Indications for airway management: anesthesia  Spontaneous Ventilation: absent  Sedation level: deep  Preoxygenated: yes  Patient position: sniffing  Mask difficulty assessment: 0 - not attempted    Final Airway Details  Final airway type: supraglottic airway      Successful airway: classic  Size 4       Number of attempts at approach: 1  Number of other approaches attempted: 0

## 2023-12-14 ENCOUNTER — TELEPHONE (OUTPATIENT)
Dept: SURGERY | Facility: CLINIC | Age: 78
End: 2023-12-14

## 2023-12-14 NOTE — TELEPHONE ENCOUNTER
Spouse calling regarding msg below. States note can be faxed to cardiac rehab dept at [phone].

## 2023-12-14 NOTE — TELEPHONE ENCOUNTER
Pt called stating pt had surgery yesterday 12-13-23.  Instruction to schedule post op appointment in one month.  No appointments available.  Pt requesting a note to return to cardiac rehab for Monday 12-18-23. Please call pt

## 2023-12-15 ENCOUNTER — APPOINTMENT (OUTPATIENT)
Dept: CARDIAC REHAB | Facility: HOSPITAL | Age: 78
End: 2023-12-15
Attending: INTERNAL MEDICINE
Payer: MEDICARE

## 2023-12-18 ENCOUNTER — CARDPULM VISIT (OUTPATIENT)
Dept: CARDIAC REHAB | Facility: HOSPITAL | Age: 78
End: 2023-12-18
Attending: INTERNAL MEDICINE
Payer: MEDICARE

## 2023-12-18 PROCEDURE — 93798 PHYS/QHP OP CAR RHAB W/ECG: CPT

## 2023-12-18 NOTE — TELEPHONE ENCOUNTER
Pt  came to the  to schedule a Post -Op appointment . Unable to schedule thru  call Center,  Surgery  was 12/13/23. Pt accepted  1/8/23 Ok'ed by Elizabeth  with Dr Arceo

## 2023-12-20 ENCOUNTER — CARDPULM VISIT (OUTPATIENT)
Dept: CARDIAC REHAB | Facility: HOSPITAL | Age: 78
End: 2023-12-20
Attending: INTERNAL MEDICINE
Payer: MEDICARE

## 2023-12-20 PROCEDURE — 93798 PHYS/QHP OP CAR RHAB W/ECG: CPT

## 2023-12-22 ENCOUNTER — CARDPULM VISIT (OUTPATIENT)
Dept: CARDIAC REHAB | Facility: HOSPITAL | Age: 78
End: 2023-12-22
Attending: INTERNAL MEDICINE
Payer: MEDICARE

## 2023-12-22 PROCEDURE — 93798 PHYS/QHP OP CAR RHAB W/ECG: CPT

## 2023-12-27 ENCOUNTER — CARDPULM VISIT (OUTPATIENT)
Dept: CARDIAC REHAB | Facility: HOSPITAL | Age: 78
End: 2023-12-27
Attending: INTERNAL MEDICINE
Payer: MEDICARE

## 2023-12-27 PROCEDURE — 93798 PHYS/QHP OP CAR RHAB W/ECG: CPT

## 2023-12-29 ENCOUNTER — CARDPULM VISIT (OUTPATIENT)
Dept: CARDIAC REHAB | Facility: HOSPITAL | Age: 78
End: 2023-12-29
Attending: INTERNAL MEDICINE
Payer: MEDICARE

## 2023-12-29 PROCEDURE — 93798 PHYS/QHP OP CAR RHAB W/ECG: CPT

## 2024-01-03 ENCOUNTER — LAB ENCOUNTER (OUTPATIENT)
Dept: LAB | Facility: HOSPITAL | Age: 79
End: 2024-01-03
Attending: INTERNAL MEDICINE
Payer: MEDICARE

## 2024-01-03 ENCOUNTER — CARDPULM VISIT (OUTPATIENT)
Dept: CARDIAC REHAB | Facility: HOSPITAL | Age: 79
End: 2024-01-03
Attending: INTERNAL MEDICINE
Payer: MEDICARE

## 2024-01-03 DIAGNOSIS — I10 ESSENTIAL HYPERTENSION: ICD-10-CM

## 2024-01-03 LAB
ANION GAP SERPL CALC-SCNC: 5 MMOL/L (ref 0–18)
BUN BLD-MCNC: 12 MG/DL (ref 9–23)
BUN/CREAT SERPL: 10.3 (ref 10–20)
CALCIUM BLD-MCNC: 9.1 MG/DL (ref 8.7–10.4)
CHLORIDE SERPL-SCNC: 105 MMOL/L (ref 98–112)
CO2 SERPL-SCNC: 30 MMOL/L (ref 21–32)
CREAT BLD-MCNC: 1.16 MG/DL
EGFRCR SERPLBLD CKD-EPI 2021: 64 ML/MIN/1.73M2 (ref 60–?)
FASTING STATUS PATIENT QL REPORTED: YES
GLUCOSE BLD-MCNC: 104 MG/DL (ref 70–99)
OSMOLALITY SERPL CALC.SUM OF ELEC: 290 MOSM/KG (ref 275–295)
POTASSIUM SERPL-SCNC: 4.3 MMOL/L (ref 3.5–5.1)
SODIUM SERPL-SCNC: 140 MMOL/L (ref 136–145)

## 2024-01-03 PROCEDURE — 93798 PHYS/QHP OP CAR RHAB W/ECG: CPT

## 2024-01-03 PROCEDURE — 36415 COLL VENOUS BLD VENIPUNCTURE: CPT

## 2024-01-03 PROCEDURE — 80048 BASIC METABOLIC PNL TOTAL CA: CPT

## 2024-01-05 ENCOUNTER — CARDPULM VISIT (OUTPATIENT)
Dept: CARDIAC REHAB | Facility: HOSPITAL | Age: 79
End: 2024-01-05
Attending: INTERNAL MEDICINE
Payer: MEDICARE

## 2024-01-05 PROCEDURE — 93798 PHYS/QHP OP CAR RHAB W/ECG: CPT

## 2024-01-08 ENCOUNTER — TELEPHONE (OUTPATIENT)
Dept: INTERNAL MEDICINE CLINIC | Facility: CLINIC | Age: 79
End: 2024-01-08

## 2024-01-08 ENCOUNTER — CARDPULM VISIT (OUTPATIENT)
Dept: CARDIAC REHAB | Facility: HOSPITAL | Age: 79
End: 2024-01-08
Attending: INTERNAL MEDICINE
Payer: MEDICARE

## 2024-01-08 ENCOUNTER — OFFICE VISIT (OUTPATIENT)
Dept: SURGERY | Facility: CLINIC | Age: 79
End: 2024-01-08
Payer: COMMERCIAL

## 2024-01-08 DIAGNOSIS — N13.8 BPH WITH OBSTRUCTION/LOWER URINARY TRACT SYMPTOMS: Primary | ICD-10-CM

## 2024-01-08 DIAGNOSIS — N40.1 BPH WITH OBSTRUCTION/LOWER URINARY TRACT SYMPTOMS: Primary | ICD-10-CM

## 2024-01-08 PROCEDURE — 1126F AMNT PAIN NOTED NONE PRSNT: CPT | Performed by: UROLOGY

## 2024-01-08 PROCEDURE — 93798 PHYS/QHP OP CAR RHAB W/ECG: CPT

## 2024-01-08 PROCEDURE — 99024 POSTOP FOLLOW-UP VISIT: CPT | Performed by: UROLOGY

## 2024-01-08 PROCEDURE — 1159F MED LIST DOCD IN RCRD: CPT | Performed by: UROLOGY

## 2024-01-08 NOTE — PROGRESS NOTES
Yves Love is a 78 year old male.    HPI:     Chief Complaint   Patient presents with    BPH     Post-op patient states he feels great       78-year-old male status post cystoscopy, greenlight laser vaporization of the prostate 2023 presents for postop check.  Overall feels well.  No problems or concerns.  No gross hematuria.  Reports a remarkable improvement in symptoms.  Nocturia has decreased from 4-5 times a night to only once a night daytime frequency urgency have improved.  He did not fill out the IPSS symptom score.  He takes no prostate medications at present.      HISTORY:  Past Medical History:   Diagnosis Date    Arthritis     Coronary atherosclerosis     Diabetes (HCC)     Esophageal reflux     High blood pressure     High cholesterol     Hyperlipidemia     S/P TURP (transurethral resection of prostate) 2007    Unspecified essential hypertension     Visual impairment     reading      Past Surgical History:   Procedure Laterality Date    ADENOIDECTOMY      As a young child    CATH BARE METAL STENT (BMS)  2023    patient goes to outpatient cardiac rehab on     COLONOSCOPY  5 yrs ago    Done by Dr. Trevino at [facility]    HAND/FINGER SURGERY UNLISTED Right 2013    R thumb reconstructive surgery - successful    KNEE ARTHROSCOPY Right 2003    OTHER SURGICAL HISTORY  2008    Dr. Holman @ [facility]    T&A      TONSILLECTOMY  Do not recall..    As a child      Family History   Problem Relation Age of Onset    Stroke Mother 65        /blood clot to the brain    Hypertension Mother         Martha Pratt (maiden name)    Cancer Sister 40    Cancer Father         Legal name was Abundio ([place] [employer] for [employer])    Diabetes Maternal Grandmother     Cancer Sister         Brain cancer ()    Cancer Brother          / colan cancer      Social History:   Social History     Socioeconomic History    Marital status:    Tobacco Use     Smoking status: Former     Packs/day: 1.00     Years: 50.00     Additional pack years: 0.00     Total pack years: 50.00     Types: Cigarettes     Quit date: 6/15/2009     Years since quittin.5    Smokeless tobacco: Never   Vaping Use    Vaping Use: Never used   Substance and Sexual Activity    Alcohol use: Yes     Alcohol/week: 2.0 standard drinks of alcohol     Types: 2 Cans of beer per week     Comment: Socially and while grilling    Drug use: Never   Other Topics Concern    Caffeine Concern Yes     Comment: Coffee 3 cups daily        Medications (Active prior to today's visit):  Current Outpatient Medications   Medication Sig Dispense Refill    clopidogrel 75 MG Oral Tab Take 1 tablet (75 mg total) by mouth daily.      Lancets (ONETOUCH DELICA PLUS [id number]) Does not apply Misc USE FOR BLOOD SUGAR TESTING DAILY 100 each 3    Glucose Blood (ONETOUCH VERIO) In Vitro Strip Use for blood sugar testing daily 100 strip 3    tamsulosin 0.4 MG Oral Cap Take 1 capsule (0.4 mg total) by mouth daily. 90 capsule 3    enalapril 10 MG Oral Tab Take 1 tablet (10 mg total) by mouth 2 (two) times daily.      metFORMIN 500 MG Oral Tab Take 1 tablet (500 mg total) by mouth daily with breakfast. 90 tablet 3    atorvastatin 20 MG Oral Tab Take 1 tablet (20 mg total) by mouth nightly. 90 tablet 3    dutasteride 0.5 MG Oral Cap Take 1 capsule (0.5 mg total) by mouth daily. 90 capsule 3    Blood Glucose Monitoring Suppl (ONETOUCH VERIO IQ SYSTEM) w/Device Does not apply Kit Use for blood sugar testing daily 1 kit 1    aspirin 81 MG Oral Tab EC Take 1 tablet (81 mg total) by mouth daily.         Allergies:  Allergies   Allergen Reactions    Bees SWELLING         ROS:       PHYSICAL EXAM:        ASSESSMENT/PLAN:   Assessment   Encounter Diagnosis   Name Primary?    BPH with obstruction/lower urinary tract symptoms Yes       Recommend:  - Follow-up in 6 months.  - Call the office if he has any problems or concerns.         Orders  This Visit:  No orders of the defined types were placed in this encounter.      Meds This Visit:  Requested Prescriptions      No prescriptions requested or ordered in this encounter       Imaging & Referrals:  None     1/8/2024  Sebastian Arceo MD

## 2024-01-10 ENCOUNTER — CARDPULM VISIT (OUTPATIENT)
Dept: CARDIAC REHAB | Facility: HOSPITAL | Age: 79
End: 2024-01-10
Attending: INTERNAL MEDICINE
Payer: MEDICARE

## 2024-01-10 PROCEDURE — 93798 PHYS/QHP OP CAR RHAB W/ECG: CPT

## 2024-01-12 ENCOUNTER — CARDPULM VISIT (OUTPATIENT)
Dept: CARDIAC REHAB | Facility: HOSPITAL | Age: 79
End: 2024-01-12
Attending: INTERNAL MEDICINE
Payer: MEDICARE

## 2024-01-12 PROCEDURE — 93798 PHYS/QHP OP CAR RHAB W/ECG: CPT

## 2024-01-15 ENCOUNTER — CARDPULM VISIT (OUTPATIENT)
Dept: CARDIAC REHAB | Facility: HOSPITAL | Age: 79
End: 2024-01-15
Attending: INTERNAL MEDICINE
Payer: MEDICARE

## 2024-01-15 PROCEDURE — 93798 PHYS/QHP OP CAR RHAB W/ECG: CPT

## 2024-01-17 ENCOUNTER — CARDPULM VISIT (OUTPATIENT)
Dept: CARDIAC REHAB | Facility: HOSPITAL | Age: 79
End: 2024-01-17
Attending: INTERNAL MEDICINE
Payer: MEDICARE

## 2024-01-17 PROCEDURE — 93798 PHYS/QHP OP CAR RHAB W/ECG: CPT

## 2024-01-19 ENCOUNTER — CARDPULM VISIT (OUTPATIENT)
Dept: CARDIAC REHAB | Facility: HOSPITAL | Age: 79
End: 2024-01-19
Attending: INTERNAL MEDICINE
Payer: MEDICARE

## 2024-01-19 PROCEDURE — 93798 PHYS/QHP OP CAR RHAB W/ECG: CPT

## 2024-01-22 ENCOUNTER — CARDPULM VISIT (OUTPATIENT)
Dept: CARDIAC REHAB | Facility: HOSPITAL | Age: 79
End: 2024-01-22
Attending: INTERNAL MEDICINE
Payer: MEDICARE

## 2024-01-22 PROCEDURE — 93798 PHYS/QHP OP CAR RHAB W/ECG: CPT

## 2024-01-24 ENCOUNTER — CARDPULM VISIT (OUTPATIENT)
Dept: CARDIAC REHAB | Facility: HOSPITAL | Age: 79
End: 2024-01-24
Attending: INTERNAL MEDICINE
Payer: MEDICARE

## 2024-01-24 PROCEDURE — 93798 PHYS/QHP OP CAR RHAB W/ECG: CPT

## 2024-01-26 ENCOUNTER — CARDPULM VISIT (OUTPATIENT)
Dept: CARDIAC REHAB | Facility: HOSPITAL | Age: 79
End: 2024-01-26
Attending: INTERNAL MEDICINE
Payer: MEDICARE

## 2024-01-26 PROCEDURE — 93798 PHYS/QHP OP CAR RHAB W/ECG: CPT

## 2024-01-29 ENCOUNTER — CARDPULM VISIT (OUTPATIENT)
Dept: CARDIAC REHAB | Facility: HOSPITAL | Age: 79
End: 2024-01-29
Attending: INTERNAL MEDICINE
Payer: MEDICARE

## 2024-01-29 PROCEDURE — 93798 PHYS/QHP OP CAR RHAB W/ECG: CPT

## 2024-01-31 ENCOUNTER — CARDPULM VISIT (OUTPATIENT)
Dept: CARDIAC REHAB | Facility: HOSPITAL | Age: 79
End: 2024-01-31
Attending: INTERNAL MEDICINE
Payer: MEDICARE

## 2024-01-31 PROCEDURE — 93798 PHYS/QHP OP CAR RHAB W/ECG: CPT

## 2024-02-02 ENCOUNTER — APPOINTMENT (OUTPATIENT)
Dept: CARDIAC REHAB | Facility: HOSPITAL | Age: 79
End: 2024-02-02
Attending: INTERNAL MEDICINE
Payer: MEDICARE

## 2024-02-13 ENCOUNTER — OFFICE VISIT (OUTPATIENT)
Dept: INTERNAL MEDICINE CLINIC | Facility: CLINIC | Age: 79
End: 2024-02-13

## 2024-02-13 ENCOUNTER — TELEPHONE (OUTPATIENT)
Dept: INTERNAL MEDICINE CLINIC | Facility: CLINIC | Age: 79
End: 2024-02-13

## 2024-02-13 VITALS
HEART RATE: 68 BPM | HEIGHT: 68.5 IN | DIASTOLIC BLOOD PRESSURE: 74 MMHG | BODY MASS INDEX: 27.72 KG/M2 | TEMPERATURE: 98 F | SYSTOLIC BLOOD PRESSURE: 142 MMHG | WEIGHT: 185 LBS

## 2024-02-13 DIAGNOSIS — I73.9 PVD (PERIPHERAL VASCULAR DISEASE) (HCC): ICD-10-CM

## 2024-02-13 DIAGNOSIS — Z90.79 S/P TURP (TRANSURETHRAL RESECTION OF PROSTATE): ICD-10-CM

## 2024-02-13 DIAGNOSIS — E11.8 TYPE 2 DIABETES MELLITUS WITH COMPLICATION, WITHOUT LONG-TERM CURRENT USE OF INSULIN (HCC): Primary | ICD-10-CM

## 2024-02-13 DIAGNOSIS — E78.00 HYPERCHOLESTEREMIA: ICD-10-CM

## 2024-02-13 DIAGNOSIS — I10 ESSENTIAL HYPERTENSION: ICD-10-CM

## 2024-02-13 DIAGNOSIS — E78.00 HYPERCHOLESTEREMIA: Primary | ICD-10-CM

## 2024-02-13 PROCEDURE — 1170F FXNL STATUS ASSESSED: CPT | Performed by: INTERNAL MEDICINE

## 2024-02-13 PROCEDURE — 3078F DIAST BP <80 MM HG: CPT | Performed by: INTERNAL MEDICINE

## 2024-02-13 PROCEDURE — 99214 OFFICE O/P EST MOD 30 MIN: CPT | Performed by: INTERNAL MEDICINE

## 2024-02-13 PROCEDURE — 1126F AMNT PAIN NOTED NONE PRSNT: CPT | Performed by: INTERNAL MEDICINE

## 2024-02-13 PROCEDURE — 3077F SYST BP >= 140 MM HG: CPT | Performed by: INTERNAL MEDICINE

## 2024-02-13 PROCEDURE — 1159F MED LIST DOCD IN RCRD: CPT | Performed by: INTERNAL MEDICINE

## 2024-02-13 PROCEDURE — 3008F BODY MASS INDEX DOCD: CPT | Performed by: INTERNAL MEDICINE

## 2024-02-13 NOTE — PROGRESS NOTES
Yves Love is a 78 year old male.  HPI:   He is here for check up.     DM - last A1C 5.7 - on metformin 500 mg daily.     IHD - stent Aug 2023 and doing well     He had prostate surgery Dec, 2023 Dr Arceo - marked improvement in nocturia     HTN    Hypercholesterolemia     He notes constipation last month or so.       SR: no chest pain or sob, no gu or gi sx.    BP Readings from Last 6 Encounters:   02/13/24 142/74   12/13/23 (!) 164/70   11/01/23 130/72   09/26/23 126/68   08/15/23 146/71   05/01/23 140/60     Wt Readings from Last 6 Encounters:   02/13/24 185 lb (83.9 kg)   12/13/23 185 lb (83.9 kg)   11/01/23 191 lb 12.8 oz (87 kg)   08/09/23 190 lb (86.2 kg)   05/01/23 189 lb (85.7 kg)   01/24/23 192 lb (87.1 kg)     Current Outpatient Medications   Medication Sig Dispense Refill    clopidogrel 75 MG Oral Tab Take 1 tablet (75 mg total) by mouth daily.      Lancets (ONETOUCH DELICA PLUS [id number]) Does not apply Misc USE FOR BLOOD SUGAR TESTING DAILY 100 each 3    Glucose Blood (ONETOUCH VERIO) In Vitro Strip Use for blood sugar testing daily 100 strip 3    enalapril 10 MG Oral Tab Take 1 tablet (10 mg total) by mouth 2 (two) times daily.      metFORMIN 500 MG Oral Tab Take 1 tablet (500 mg total) by mouth daily with breakfast. 90 tablet 3    atorvastatin 20 MG Oral Tab Take 1 tablet (20 mg total) by mouth nightly. 90 tablet 3    Blood Glucose Monitoring Suppl (ONETOUCH VERIO IQ SYSTEM) w/Device Does not apply Kit Use for blood sugar testing daily 1 kit 1    aspirin 81 MG Oral Tab EC Take 1 tablet (81 mg total) by mouth daily.        Past Medical History:   Diagnosis Date    Arthritis     Coronary atherosclerosis     Diabetes (HCC)     Esophageal reflux     High blood pressure     High cholesterol     Hyperlipidemia     S/P TURP (transurethral resection of prostate) 2007    Unspecified essential hypertension     Visual impairment     reading      Social History:  Social History     Socioeconomic History     Marital status:    Tobacco Use    Smoking status: Former     Packs/day: 1.00     Years: 50.00     Additional pack years: 0.00     Total pack years: 50.00     Types: Cigarettes     Quit date: 6/15/2009     Years since quittin.6    Smokeless tobacco: Never   Vaping Use    Vaping Use: Never used   Substance and Sexual Activity    Alcohol use: Yes     Alcohol/week: 2.0 standard drinks of alcohol     Types: 2 Cans of beer per week     Comment: Socially and while grilling    Drug use: Never   Other Topics Concern    Caffeine Concern Yes     Comment: Coffee 3 cups daily        REVIEW OF SYSTEMS:   GENERAL HEALTH: feels well otherwise  SKIN: denies any unusual skin lesions or rashes  RESPIRATORY: denies shortness of breath with exertion  CARDIOVASCULAR: denies chest pain on exertion  GI: denies abdominal pain and denies heartburn  NEURO: denies headaches    EXAM:   /74   Pulse 68   Temp 97.8 °F (36.6 °C) (Oral)   Ht 5' 8.5\" (1.74 m)   Wt 185 lb (83.9 kg)   BMI 27.72 kg/m²   GENERAL: well developed, well nourished,in no apparent distress  SKIN: no rashes,no suspicious lesions  HEENT: atraumatic, normocephalic,ears and throat are clear  NECK: supple,no adenopathy,no bruits  LUNGS: clear to auscultation  CARDIO: RRR without murmur  GI: good BS's,no masses, HSM or tenderness  EXTREMITIES: no cyanosis, clubbing or edema    ASSESSMENT AND PLAN:   1. Type 2 diabetes mellitus with complication, without long-term current use of insulin ([facility])  A1C 5.7    2. S/P TURP (transurethral resection of prostate)  Doing well post TURP     3. PVD (peripheral vascular disease) ([facility])  No sx     4. Hypercholesteremia  At goal, same meds     5. Essential hypertension  Near goal, same meds     The patient indicates understanding of these issues and agrees to the plan.  The patient is asked to return in 6 mo.

## 2024-06-24 ENCOUNTER — LAB ENCOUNTER (OUTPATIENT)
Dept: LAB | Age: 79
End: 2024-06-24
Attending: UROLOGY

## 2024-06-24 ENCOUNTER — TELEPHONE (OUTPATIENT)
Dept: SURGERY | Facility: CLINIC | Age: 79
End: 2024-06-24

## 2024-06-24 DIAGNOSIS — R33.9 URINARY RETENTION: Primary | ICD-10-CM

## 2024-06-24 DIAGNOSIS — R35.0 URINARY FREQUENCY: ICD-10-CM

## 2024-06-24 PROCEDURE — 87086 URINE CULTURE/COLONY COUNT: CPT

## 2024-06-24 PROCEDURE — 81003 URINALYSIS AUTO W/O SCOPE: CPT

## 2024-06-24 NOTE — TELEPHONE ENCOUNTER
I called the patient and confirmed his full name and . Pt says that his problems started a few weeks ago but at that time it only happened once or twice.     The last few nights the patient has had urinary frequency and has gotten up several times a night. When this happens he is having problems starting his stream. He also has pain in his penis. Sometimes is helps when he sits down to urinate but it will still take a little bit to get started and he may even have to push. He states that is feels like something is blocking his stream. He does have occasional pain in his bladder area.    During the day is better.    He denies any flank pain, fever, or blood in his urine. He does say that occasionally he has discomfort and maybe some burning with urination.    I asked him to go to the lab and give a urine sample so we can rule out a UTI. If it is not a UTI, we will try and see if we can get him an earlier appointment.     In the meantime I advised the patient to call us during office hours or go to the ER if he cannot urinate or his having blood in his urine.     Pt verbalized his understanding.

## 2024-06-24 NOTE — TELEPHONE ENCOUNTER
Pt's wife called and stated that Pt is having night frequency with pain and  has to  push to urinate.Pt has a appointment  on  7/8/24

## 2024-06-25 NOTE — TELEPHONE ENCOUNTER
I called the patient and confirmed his full name and date of birth. I told the patient that his urine culture did not show any bacteria. I asked the patient about constipation and he told me that once he stopped the stool softeners after surgery his bowel movements slowed down.    I recommended that the patient start back on stool softeners and see whether that helps with his urinary problems. Pt has an appointment on July 8, 2024.

## 2024-07-08 ENCOUNTER — OFFICE VISIT (OUTPATIENT)
Dept: SURGERY | Facility: CLINIC | Age: 79
End: 2024-07-08
Payer: COMMERCIAL

## 2024-07-08 DIAGNOSIS — R33.9 URINARY RETENTION: Primary | ICD-10-CM

## 2024-07-08 DIAGNOSIS — N40.1 BPH WITH OBSTRUCTION/LOWER URINARY TRACT SYMPTOMS: ICD-10-CM

## 2024-07-08 DIAGNOSIS — N13.8 BPH WITH OBSTRUCTION/LOWER URINARY TRACT SYMPTOMS: ICD-10-CM

## 2024-07-08 PROCEDURE — 99214 OFFICE O/P EST MOD 30 MIN: CPT | Performed by: UROLOGY

## 2024-07-08 PROCEDURE — 1159F MED LIST DOCD IN RCRD: CPT | Performed by: UROLOGY

## 2024-07-08 RX ORDER — MIRABEGRON 50 MG/1
50 TABLET, EXTENDED RELEASE ORAL DAILY
Qty: 30 TABLET | Refills: 5 | Status: SHIPPED | OUTPATIENT
Start: 2024-07-08 | End: 2025-01-04

## 2024-07-08 NOTE — PROGRESS NOTES
Yves Love is a 78 year old male.    HPI:     Chief Complaint   Patient presents with    Follow - Up     Patient is here for a 6 month f/u. Patient states to have urine frequency 1x every hour.  Patient states to have pressure pain.        78-year-old male accompanied by his wife in follow-up to visit January 8, 2024.  The patient has a chronic history of BPH, lower urinary tract symptoms for which he underwent greenlight laser vaporization of the prostate December 13, 2023.  Initially he was doing very well.  About 3 to 4 weeks ago in the context of moderate to severe constipation he started having daytime frequency and urgency.  He continues to report drastic improvement in his nocturia compared with preoperative levels.  Preoperatively, he was waking up 3-4 times a night.  He awakens mostly once a night now.  IPSS score continues to be elevated at 19 reflective of daytime frequency and urgency.  He admits to drinking 2 large cups of caffeinated coffee in the morning and water throughout the day.  He does not consume alcohol.  No other caffeine during the day.  No dysuria or gross hematuria.  On June 24, 2024 he contacted the office with the symptoms and a urinalysis and urine cultures were both unremarkable.  He states his constipation is better and his daytime frequency has improved.      HISTORY:  Past Medical History:    Arthritis    Coronary atherosclerosis    Diabetes (HCC)    Esophageal reflux    High blood pressure    High cholesterol    Hyperlipidemia    S/P TURP (transurethral resection of prostate)    Unspecified essential hypertension    Visual impairment    reading      Past Surgical History:   Procedure Laterality Date    Adenoidectomy      As a young child    Cath bare metal stent (bms)  07/2023    patient goes to outpatient cardiac rehab on mon wed fri    Colonoscopy  5 yrs ago    Done by Dr. Trevino at [place]    Hand/finger surgery unlisted Right 01/01/2013    R thumb reconstructive  surgery - successful    Knee arthroscopy Right 2003    Other surgical history  2008    Dr. Holman @ [facility]    T&a      Tonsillectomy  Do not recall..    As a child      Family History   Problem Relation Age of Onset    Stroke Mother 65        /blood clot to the brain    Hypertension Mother         Martha Pratt (maiden name)    Cancer Sister 40    Cancer Father         Legal name was Abundio (Citizen of [place] for Dustin)    Diabetes Maternal Grandmother     Cancer Sister         Brain cancer ()    Cancer Brother          / colan cancer      Social History:   Social History     Socioeconomic History    Marital status:    Tobacco Use    Smoking status: Former     Current packs/day: 0.00     Average packs/day: 1 pack/day for 50.0 years (50.0 ttl pk-yrs)     Types: Cigarettes     Start date: 6/15/1959     Quit date: 6/15/2009     Years since quitting: 15.0    Smokeless tobacco: Never   Vaping Use    Vaping status: Never Used   Substance and Sexual Activity    Alcohol use: Yes     Alcohol/week: 2.0 standard drinks of alcohol     Types: 2 Cans of beer per week     Comment: Socially and while grilling    Drug use: Never   Other Topics Concern    Caffeine Concern Yes     Comment: Coffee 3 cups daily        Medications (Active prior to today's visit):  Current Outpatient Medications   Medication Sig Dispense Refill    Mirabegron ER 50 MG Oral Tablet 24 Hr Take 1 tablet (50 mg total) by mouth daily. 30 tablet 5    clopidogrel 75 MG Oral Tab Take 1 tablet (75 mg total) by mouth daily.      Lancets (ONETOUCH DELICA PLUS [id number]) Does not apply Misc USE FOR BLOOD SUGAR TESTING DAILY 100 each 3    Glucose Blood (ONETOUCH VERIO) In Vitro Strip Use for blood sugar testing daily 100 strip 3    enalapril 10 MG Oral Tab Take 1 tablet (10 mg total) by mouth 2 (two) times daily.      metFORMIN 500 MG Oral Tab Take 1 tablet (500 mg total) by mouth daily with breakfast. 90 tablet  3    atorvastatin 20 MG Oral Tab Take 1 tablet (20 mg total) by mouth nightly. 90 tablet 3    Blood Glucose Monitoring Suppl (ONETOUCH VERIO IQ SYSTEM) w/Device Does not apply Kit Use for blood sugar testing daily 1 kit 1    aspirin 81 MG Oral Tab EC Take 1 tablet (81 mg total) by mouth daily.         Allergies:  Allergies   Allergen Reactions    Bees SWELLING         ROS:       PHYSICAL EXAM:   He states he voided about 30 to 40 minutes ago.  Bladder scan for postvoid residual volume 239 mL     ASSESSMENT/PLAN:   Assessment   Encounter Diagnoses   Name Primary?    Urinary retention Yes    BPH with obstruction/lower urinary tract symptoms        Recommend:  - Continue to observe symptoms for the time being.  - If still bothersome once he has decreased caffeine intake which I suggested today he may consider a trial of Myrbetriq 50 mg daily.  Side effects were reviewed.  - Follow-up in 3 months to reassess symptoms.    I spent a total of 25 minutes with patient more than half the time in face-to-face discussion.         Orders This Visit:  No orders of the defined types were placed in this encounter.      Meds This Visit:  Requested Prescriptions     Signed Prescriptions Disp Refills    Mirabegron ER 50 MG Oral Tablet 24 Hr 30 tablet 5     Sig: Take 1 tablet (50 mg total) by mouth daily.       Imaging & Referrals:  None     7/8/2024  Sebastian Arceo MD

## 2024-09-16 ENCOUNTER — LAB ENCOUNTER (OUTPATIENT)
Dept: LAB | Facility: HOSPITAL | Age: 79
End: 2024-09-16
Attending: INTERNAL MEDICINE
Payer: MEDICARE

## 2024-09-16 DIAGNOSIS — E78.00 HYPERCHOLESTEREMIA: ICD-10-CM

## 2024-09-16 LAB
ALBUMIN SERPL-MCNC: 4.7 G/DL (ref 3.2–4.8)
ALBUMIN/GLOB SERPL: 1.5 {RATIO} (ref 1–2)
ALP LIVER SERPL-CCNC: 67 U/L
ALT SERPL-CCNC: 21 U/L
ANION GAP SERPL CALC-SCNC: 7 MMOL/L (ref 0–18)
AST SERPL-CCNC: 22 U/L (ref ?–34)
BASOPHILS # BLD AUTO: 0.04 X10(3) UL (ref 0–0.2)
BASOPHILS NFR BLD AUTO: 0.5 %
BILIRUB SERPL-MCNC: 0.9 MG/DL (ref 0.2–1.1)
BUN BLD-MCNC: 21 MG/DL (ref 9–23)
BUN/CREAT SERPL: 15.2 (ref 10–20)
CALCIUM BLD-MCNC: 9.5 MG/DL (ref 8.7–10.4)
CHLORIDE SERPL-SCNC: 105 MMOL/L (ref 98–112)
CHOLEST SERPL-MCNC: 100 MG/DL (ref ?–200)
CO2 SERPL-SCNC: 29 MMOL/L (ref 21–32)
CREAT BLD-MCNC: 1.38 MG/DL
DEPRECATED RDW RBC AUTO: 45.5 FL (ref 35.1–46.3)
EGFRCR SERPLBLD CKD-EPI 2021: 52 ML/MIN/1.73M2 (ref 60–?)
EOSINOPHIL # BLD AUTO: 0.13 X10(3) UL (ref 0–0.7)
EOSINOPHIL NFR BLD AUTO: 1.7 %
ERYTHROCYTE [DISTWIDTH] IN BLOOD BY AUTOMATED COUNT: 13.4 % (ref 11–15)
EST. AVERAGE GLUCOSE BLD GHB EST-MCNC: 120 MG/DL (ref 68–126)
FASTING PATIENT LIPID ANSWER: YES
FASTING STATUS PATIENT QL REPORTED: YES
GLOBULIN PLAS-MCNC: 3.2 G/DL (ref 2–3.5)
GLUCOSE BLD-MCNC: 108 MG/DL (ref 70–99)
HBA1C MFR BLD: 5.8 % (ref ?–5.7)
HCT VFR BLD AUTO: 45.7 %
HDLC SERPL-MCNC: 37 MG/DL (ref 40–59)
HGB BLD-MCNC: 14.8 G/DL
IMM GRANULOCYTES # BLD AUTO: 0.02 X10(3) UL (ref 0–1)
IMM GRANULOCYTES NFR BLD: 0.3 %
LDLC SERPL CALC-MCNC: 48 MG/DL (ref ?–100)
LYMPHOCYTES # BLD AUTO: 3.24 X10(3) UL (ref 1–4)
LYMPHOCYTES NFR BLD AUTO: 41.4 %
MCH RBC QN AUTO: 30.3 PG (ref 26–34)
MCHC RBC AUTO-ENTMCNC: 32.4 G/DL (ref 31–37)
MCV RBC AUTO: 93.6 FL
MONOCYTES # BLD AUTO: 1 X10(3) UL (ref 0.1–1)
MONOCYTES NFR BLD AUTO: 12.8 %
NEUTROPHILS # BLD AUTO: 3.39 X10 (3) UL (ref 1.5–7.7)
NEUTROPHILS # BLD AUTO: 3.39 X10(3) UL (ref 1.5–7.7)
NEUTROPHILS NFR BLD AUTO: 43.3 %
NONHDLC SERPL-MCNC: 63 MG/DL (ref ?–130)
OSMOLALITY SERPL CALC.SUM OF ELEC: 296 MOSM/KG (ref 275–295)
PLATELET # BLD AUTO: 193 10(3)UL (ref 150–450)
POTASSIUM SERPL-SCNC: 4.4 MMOL/L (ref 3.5–5.1)
PROT SERPL-MCNC: 7.9 G/DL (ref 5.7–8.2)
RBC # BLD AUTO: 4.88 X10(6)UL
SODIUM SERPL-SCNC: 141 MMOL/L (ref 136–145)
TRIGL SERPL-MCNC: 71 MG/DL (ref 30–149)
TSI SER-ACNC: 2.7 MIU/ML (ref 0.55–4.78)
VLDLC SERPL CALC-MCNC: 10 MG/DL (ref 0–30)
WBC # BLD AUTO: 7.8 X10(3) UL (ref 4–11)

## 2024-09-16 PROCEDURE — 80053 COMPREHEN METABOLIC PANEL: CPT

## 2024-09-16 PROCEDURE — 85025 COMPLETE CBC W/AUTO DIFF WBC: CPT

## 2024-09-16 PROCEDURE — 83036 HEMOGLOBIN GLYCOSYLATED A1C: CPT

## 2024-09-16 PROCEDURE — 36415 COLL VENOUS BLD VENIPUNCTURE: CPT

## 2024-09-16 PROCEDURE — 84443 ASSAY THYROID STIM HORMONE: CPT

## 2024-09-16 PROCEDURE — 80061 LIPID PANEL: CPT

## 2024-09-17 ENCOUNTER — OFFICE VISIT (OUTPATIENT)
Dept: INTERNAL MEDICINE CLINIC | Facility: CLINIC | Age: 79
End: 2024-09-17

## 2024-09-17 VITALS
DIASTOLIC BLOOD PRESSURE: 76 MMHG | BODY MASS INDEX: 28.46 KG/M2 | WEIGHT: 190 LBS | HEART RATE: 70 BPM | TEMPERATURE: 98 F | SYSTOLIC BLOOD PRESSURE: 142 MMHG | HEIGHT: 68.5 IN | OXYGEN SATURATION: 98 %

## 2024-09-17 DIAGNOSIS — E78.00 HYPERCHOLESTEREMIA: ICD-10-CM

## 2024-09-17 DIAGNOSIS — I73.9 PVD (PERIPHERAL VASCULAR DISEASE) (HCC): Primary | ICD-10-CM

## 2024-09-17 DIAGNOSIS — I10 ESSENTIAL HYPERTENSION: ICD-10-CM

## 2024-09-17 PROCEDURE — 1126F AMNT PAIN NOTED NONE PRSNT: CPT | Performed by: INTERNAL MEDICINE

## 2024-09-17 PROCEDURE — 99214 OFFICE O/P EST MOD 30 MIN: CPT | Performed by: INTERNAL MEDICINE

## 2024-09-17 PROCEDURE — 1159F MED LIST DOCD IN RCRD: CPT | Performed by: INTERNAL MEDICINE

## 2024-09-17 PROCEDURE — 3008F BODY MASS INDEX DOCD: CPT | Performed by: INTERNAL MEDICINE

## 2024-09-17 PROCEDURE — 3077F SYST BP >= 140 MM HG: CPT | Performed by: INTERNAL MEDICINE

## 2024-09-17 PROCEDURE — 3078F DIAST BP <80 MM HG: CPT | Performed by: INTERNAL MEDICINE

## 2024-09-17 RX ORDER — ATORVASTATIN CALCIUM 20 MG/1
20 TABLET, FILM COATED ORAL NIGHTLY
Qty: 90 TABLET | Refills: 3 | Status: SHIPPED | OUTPATIENT
Start: 2024-09-17

## 2024-09-17 NOTE — PROGRESS NOTES
Yves Love is a 79 year old male.  HPI:   He is here for check up.     IHD - no chest pain or sob    Hypercholesterolemia - LDL 48     A1C 5.8  -  continue metformin 500 mg daily.    BPH - nocturia 1-3 x per night - he will see Dr Arceo in several weeks.       SR: no chest pain or sob, no gu or gi sx.    BP Readings from Last 6 Encounters:   09/17/24 142/76   02/13/24 142/74   12/13/23 (!) 164/70   11/01/23 130/72   09/26/23 126/68   08/15/23 146/71     Wt Readings from Last 6 Encounters:   09/17/24 190 lb (86.2 kg)   02/13/24 185 lb (83.9 kg)   12/13/23 185 lb (83.9 kg)   11/01/23 191 lb 12.8 oz (87 kg)   08/09/23 190 lb (86.2 kg)   05/01/23 189 lb (85.7 kg)     Current Outpatient Medications   Medication Sig Dispense Refill    clopidogrel 75 MG Oral Tab Take 1 tablet (75 mg total) by mouth daily.      Lancets (ONETOUCH DELICA PLUS [id number]) Does not apply Misc USE FOR BLOOD SUGAR TESTING DAILY 100 each 3    enalapril 10 MG Oral Tab Take 1 tablet (10 mg total) by mouth 2 (two) times daily.      metFORMIN 500 MG Oral Tab Take 1 tablet (500 mg total) by mouth daily with breakfast. 90 tablet 3    atorvastatin 20 MG Oral Tab Take 1 tablet (20 mg total) by mouth nightly. 90 tablet 3    Blood Glucose Monitoring Suppl (ONETOUCH VERIO IQ SYSTEM) w/Device Does not apply Kit Use for blood sugar testing daily 1 kit 1    aspirin 81 MG Oral Tab EC Take 1 tablet (81 mg total) by mouth daily.      Glucose Blood (ONETOUCH VERIO) In Vitro Strip Use for blood sugar testing daily 100 strip 3      Past Medical History:    Arthritis    Coronary atherosclerosis    Diabetes (HCC)    Esophageal reflux    High blood pressure    High cholesterol    Hyperlipidemia    S/P TURP (transurethral resection of prostate)    Unspecified essential hypertension    Visual impairment    reading      Social History:  Social History     Socioeconomic History    Marital status:    Tobacco Use    Smoking status: Former     Current packs/day:  0.00     Average packs/day: 1 pack/day for 50.0 years (50.0 ttl pk-yrs)     Types: Cigarettes     Start date: 6/15/1959     Quit date: 6/15/2009     Years since quitting: 15.2    Smokeless tobacco: Never   Vaping Use    Vaping status: Never Used   Substance and Sexual Activity    Alcohol use: Yes     Alcohol/week: 2.0 standard drinks of alcohol     Types: 2 Cans of beer per week     Comment: Socially and while grilling    Drug use: Never   Other Topics Concern    Caffeine Concern Yes     Comment: Coffee 3 cups daily        REVIEW OF SYSTEMS:   GENERAL HEALTH: feels well otherwise  SKIN: denies any unusual skin lesions or rashes  RESPIRATORY: denies shortness of breath with exertion  CARDIOVASCULAR: denies chest pain on exertion  GI: denies abdominal pain and denies heartburn  NEURO: denies headaches    EXAM:   /76   Pulse 70   Temp 97.8 °F (36.6 °C)   Ht 5' 8.5\" (1.74 m)   Wt 190 lb (86.2 kg)   SpO2 98%   BMI 28.47 kg/m²   GENERAL: well developed, well nourished,in no apparent distress  SKIN: no rashes,no suspicious lesions  HEENT: atraumatic, normocephalic,ears and throat are clear  NECK: supple,no adenopathy,no bruits  LUNGS: clear to auscultation  CARDIO: RRR without murmur  GI: good BS's,no masses, HSM or tenderness  EXTREMITIES: no cyanosis, clubbing or edema    ASSESSMENT AND PLAN:   1. PVD (peripheral vascular disease) (HCC)  No sx and doing well     2. Hypercholesteremia  LDL 48 - same treatment for now     3. Essential hypertension  Near goal, same meds     The patient indicates understanding of these issues and agrees to the plan.  The patient is asked to return in 6 mo

## 2024-10-08 ENCOUNTER — OFFICE VISIT (OUTPATIENT)
Dept: SURGERY | Facility: CLINIC | Age: 79
End: 2024-10-08
Payer: COMMERCIAL

## 2024-10-08 DIAGNOSIS — N13.8 BPH WITH OBSTRUCTION/LOWER URINARY TRACT SYMPTOMS: Primary | ICD-10-CM

## 2024-10-08 DIAGNOSIS — N40.1 BPH WITH OBSTRUCTION/LOWER URINARY TRACT SYMPTOMS: Primary | ICD-10-CM

## 2024-10-08 PROCEDURE — 99214 OFFICE O/P EST MOD 30 MIN: CPT | Performed by: UROLOGY

## 2024-10-08 PROCEDURE — 1159F MED LIST DOCD IN RCRD: CPT | Performed by: UROLOGY

## 2024-10-08 PROCEDURE — 51798 US URINE CAPACITY MEASURE: CPT | Performed by: UROLOGY

## 2024-10-08 NOTE — PROGRESS NOTES
Yves Love is a 79 year old male.    HPI:     Chief Complaint   Patient presents with    Follow - Up     Pt states he is about the same since last visit. c/o urinary urgency and frequency, feels he has a weaker stream at night time. Did not start mirabegron due to pricing.       79-year-old male accompanied by his wife in follow-up to visit July 8, 2024.  Has a history of BPH, lower urinary tract symptoms.  He is status post cystoscopy and greenlight laser vaporization of the prostate December 13, 2023.  His obstructive symptoms improved including a better force of stream nocturia that decreased from 3-4 times a night to only once or twice a night but his daytime frequency and urgency persist.  He admits to drinking 2 cups of coffee in the morning that are caffeinated and he has intermittent chronic issues related to constipation.  He has been taken and prune juice which he states helps.  No dysuria or gross hematuria.  Bladder scan for postvoid residual volume at his last visit in July demonstrated a mild to moderately an elevated residuals of 239 mL.  I started him on mirabegron he was unable to get it because of insurance and costs incurred.  He did not try it.  IPSS score remains elevated at 24 today quality-of-life index is 3.      HISTORY:  Past Medical History:    Arthritis    Coronary atherosclerosis    Diabetes (HCC)    Esophageal reflux    High blood pressure    High cholesterol    Hyperlipidemia    S/P TURP (transurethral resection of prostate)    Unspecified essential hypertension    Visual impairment    reading      Past Surgical History:   Procedure Laterality Date    Adenoidectomy      As a young child    Cath bare metal stent (bms)  07/2023    patient goes to outpatient cardiac rehab on mon wed fri    Colonoscopy  5 yrs ago    Done by Dr. Trevino at [place]    Hand/finger surgery unlisted Right 01/01/2013    R thumb reconstructive surgery - successful    Knee arthroscopy Right 01/01/2003     Other surgical history  2008    Dr. Holman @ [facility]    T&a      Tonsillectomy  Do not recall..    As a child      Family History   Problem Relation Age of Onset    Stroke Mother 65        /blood clot to the brain    Hypertension Mother         Martha Pratt (maiden name)    Cancer Sister 40    Cancer Father         Legal name was Abundio ([place] for Dustin)    Diabetes Maternal Grandmother     Cancer Sister         Brain cancer ()    Cancer Brother          / colan cancer      Social History:   Social History     Socioeconomic History    Marital status:    Tobacco Use    Smoking status: Former     Current packs/day: 0.00     Average packs/day: 1 pack/day for 50.0 years (50.0 ttl pk-yrs)     Types: Cigarettes     Start date: 6/15/1959     Quit date: 6/15/2009     Years since quitting: 15.3    Smokeless tobacco: Never   Vaping Use    Vaping status: Never Used   Substance and Sexual Activity    Alcohol use: Yes     Alcohol/week: 2.0 standard drinks of alcohol     Types: 2 Cans of beer per week     Comment: Socially and while grilling    Drug use: Never   Other Topics Concern    Caffeine Concern Yes     Comment: Coffee 3 cups daily        Medications (Active prior to today's visit):  Current Outpatient Medications   Medication Sig Dispense Refill    atorvastatin 20 MG Oral Tab Take 1 tablet (20 mg total) by mouth nightly. 90 tablet 3    clopidogrel 75 MG Oral Tab Take 1 tablet (75 mg total) by mouth daily.      Lancets (ONETOUCH DELICA PLUS [id number]) Does not apply Misc USE FOR BLOOD SUGAR TESTING DAILY 100 each 3    Glucose Blood (ONETOUCH VERIO) In Vitro Strip Use for blood sugar testing daily 100 strip 3    enalapril 10 MG Oral Tab Take 1 tablet (10 mg total) by mouth 2 (two) times daily.      metFORMIN 500 MG Oral Tab Take 1 tablet (500 mg total) by mouth daily with breakfast. 90 tablet 3    Blood Glucose Monitoring Suppl (ONETOUCH VERIO IQ SYSTEM)  w/Device Does not apply Kit Use for blood sugar testing daily 1 kit 1    aspirin 81 MG Oral Tab EC Take 1 tablet (81 mg total) by mouth daily.         Allergies:  Allergies   Allergen Reactions    Bees SWELLING         ROS:       PHYSICAL EXAM:   Bladder scan for postvoid residual volume 239 ml     ASSESSMENT/PLAN:   Assessment   Encounter Diagnosis   Name Primary?    BPH with obstruction/lower urinary tract symptoms Yes       Recommend:  -Miralax 2-3 times per week for constipation.  -Decrease caffeine intake in the morning.  -Followup in 3-4 months to assess results.           Orders This Visit:  No orders of the defined types were placed in this encounter.      Meds This Visit:  Requested Prescriptions      No prescriptions requested or ordered in this encounter       Imaging & Referrals:  None     10/8/2024  Sebastian Arceo MD

## 2024-10-15 ENCOUNTER — OFFICE VISIT (OUTPATIENT)
Dept: INTERNAL MEDICINE CLINIC | Facility: CLINIC | Age: 79
End: 2024-10-15

## 2024-10-15 VITALS
WEIGHT: 191.63 LBS | OXYGEN SATURATION: 100 % | DIASTOLIC BLOOD PRESSURE: 68 MMHG | HEIGHT: 68.5 IN | BODY MASS INDEX: 28.71 KG/M2 | HEART RATE: 59 BPM | SYSTOLIC BLOOD PRESSURE: 140 MMHG | TEMPERATURE: 98 F

## 2024-10-15 DIAGNOSIS — Z87.891 FORMER SMOKER: Primary | ICD-10-CM

## 2024-10-15 DIAGNOSIS — E11.8 TYPE 2 DIABETES MELLITUS WITH COMPLICATION, WITHOUT LONG-TERM CURRENT USE OF INSULIN (HCC): ICD-10-CM

## 2024-10-15 PROCEDURE — 3077F SYST BP >= 140 MM HG: CPT | Performed by: INTERNAL MEDICINE

## 2024-10-15 PROCEDURE — 3078F DIAST BP <80 MM HG: CPT | Performed by: INTERNAL MEDICINE

## 2024-10-15 PROCEDURE — 1160F RVW MEDS BY RX/DR IN RCRD: CPT | Performed by: INTERNAL MEDICINE

## 2024-10-15 PROCEDURE — 90662 IIV NO PRSV INCREASED AG IM: CPT | Performed by: INTERNAL MEDICINE

## 2024-10-15 PROCEDURE — 99215 OFFICE O/P EST HI 40 MIN: CPT | Performed by: INTERNAL MEDICINE

## 2024-10-15 PROCEDURE — G0008 ADMIN INFLUENZA VIRUS VAC: HCPCS | Performed by: INTERNAL MEDICINE

## 2024-10-15 PROCEDURE — 1159F MED LIST DOCD IN RCRD: CPT | Performed by: INTERNAL MEDICINE

## 2024-10-15 PROCEDURE — 3008F BODY MASS INDEX DOCD: CPT | Performed by: INTERNAL MEDICINE

## 2024-10-15 NOTE — PROGRESS NOTES
Yves Love is a 79 year old male  Chief Complaint   Patient presents with    [facility]     6 month F/U, former Dr Garcia patient     HPI:   Yves Love is a 79 year old male who presents to [facility].    LOV 9/17/24.    Since, he has been doing well. No complaints.    Bowls a few days a week. Is still able to cut his own grass and shovel snow. Walks on treadmill daily without CP, dyspnea, leg pain.    Former .    [place] .    Wt Readings from Last 6 Encounters:   10/15/24 191 lb 9.6 oz (86.9 kg)   09/17/24 190 lb (86.2 kg)   02/13/24 185 lb (83.9 kg)   12/13/23 185 lb (83.9 kg)   11/01/23 191 lb 12.8 oz (87 kg)   08/09/23 190 lb (86.2 kg)     Body mass index is 28.71 kg/m².     Current Outpatient Medications   Medication Sig Dispense Refill    atorvastatin 20 MG Oral Tab Take 1 tablet (20 mg total) by mouth nightly. 90 tablet 3    clopidogrel 75 MG Oral Tab Take 1 tablet (75 mg total) by mouth daily.      Lancets (ONETOUCH DELICA PLUS [id number]) Does not apply Misc USE FOR BLOOD SUGAR TESTING DAILY 100 each 3    Glucose Blood (ONETOUCH VERIO) In Vitro Strip Use for blood sugar testing daily 100 strip 3    enalapril 10 MG Oral Tab Take 1 tablet (10 mg total) by mouth 2 (two) times daily.      metFORMIN 500 MG Oral Tab Take 1 tablet (500 mg total) by mouth daily with breakfast. 90 tablet 3    Blood Glucose Monitoring Suppl (ONETOUCH VERIO IQ SYSTEM) w/Device Does not apply Kit Use for blood sugar testing daily 1 kit 1    aspirin 81 MG Oral Tab EC Take 1 tablet (81 mg total) by mouth daily.        Past Medical History:    Arthritis    Coronary atherosclerosis    Diabetes (HCC)    Esophageal reflux    High blood pressure    High cholesterol    Hyperlipidemia    S/P TURP (transurethral resection of prostate)    Unspecified essential hypertension    Visual impairment    reading      Past Surgical History:   Procedure Laterality Date    Adenoidectomy      As a young child     Cath bare metal stent (bms)  2023    patient goes to outpatient cardiac rehab on     Colonoscopy  5 yrs ago    Done by Dr. Trevino at [place]    Hand/finger surgery unlisted Right 2013    R thumb reconstructive surgery - successful    Knee arthroscopy Right 2003    Other surgical history  2008    Dr. Holman @ [facility]    T&a      Tonsillectomy  Do not recall..    As a child      Family History   Problem Relation Age of Onset    Stroke Mother 65        /blood clot to the brain    Hypertension Mother         Martha Pratt (maiden name)    Cancer Sister 40    Cancer Father         Legal name was Abundio (Israeli for Dustin)    Diabetes Maternal Grandmother     Cancer Sister         Brain cancer ()    Cancer Brother          / colan cancer      Social History:  Social History     Socioeconomic History    Marital status:    Tobacco Use    Smoking status: Former     Current packs/day: 0.00     Average packs/day: 1 pack/day for 50.0 years (50.0 ttl pk-yrs)     Types: Cigarettes     Start date: 6/15/1959     Quit date: 6/15/2009     Years since quitting: 15.3    Smokeless tobacco: Never   Vaping Use    Vaping status: Never Used   Substance and Sexual Activity    Alcohol use: Yes     Alcohol/week: 2.0 standard drinks of alcohol     Types: 2 Cans of beer per week     Comment: Socially and while grilling    Drug use: Never   Other Topics Concern    Caffeine Concern Yes     Comment: Coffee 3 cups daily           REVIEW OF SYSTEMS:   GENERAL: feels well otherwise    EXAM:   /68   Pulse 59   Temp 97.6 °F (36.4 °C)   Ht 5' 8.5\" (1.74 m)   Wt 191 lb 9.6 oz (86.9 kg)   SpO2 100%   BMI 28.71 kg/m²     GENERAL: well developed, well nourished, in no apparent distress  NEURO: A&O x 3, moves all 4 extremities normally      ASSESSMENT AND PLAN:   Yves Love is a 79 year old male who presents to [facility].    There are no diagnoses  linked to this encounter.    DM2   - Most recent hemoglobin A1c: 5.8% 9/16/24  - pre-prandial sugars: checks once a week or every 2 week - 138  - Current regimen:   - metformin 500 mg daily  - Complications:   - CV: atorvastatin 20 mg qhs   - Nephropathy: microalb/cr ordered   - Neuropathy: denies   - Retinopathy: none on 7/3/24  - Foot exam: at annual physical    CAD s/p PCI to LAD-diag 8/2023  - Cardiologist: Dr. Catsro  - ASA 81 mg, atorvastatin 20 mg qhs    HTN  - current regimen:  - enalapril 10 mg bid  - elevated today but pt didn't take his bp meds yet, states it is always controlled at home    HLD  - atorvastatin 20 mg at bedtime  - LDL 48 9/16/24    BPH w/LUTS  - urologist: Dr. Arceo  - s/p greenlight laser vaporization of the prostate 12/13/23    CKD3a  - monitor bmp twice yearly, avoid NSAIDs    PAD   - s/p iliac stenting w/interventional radiology, asx    Healthcare Maintenance  Cancer Screenings:  - Colon: age 79  - Lung: quit 15 years ago, recommend LDCT scan, pt declines at this time    Vaccines:  - Tdap: recommend if >10 years since prior  - Shingles: shingrix recommended  - Pneumonia: prevnar 13 3/14/18, pneumovax 23 10/27/09, prevnar 20 recommended  - Flu: recommend yearly (10/15/24)  - COVID-19: x4    Misc:  - AAA screen: negative CTA A/P 7/6/20  - PSA screen: 0.28 10/27/23    RTC in 6 months or sooner PRN.    For E/M code - 60 minutes spent reviewing performing chart review, obtaining a history, performing a physical exam, reviewing the assessment/plan, placing orders, and completing documentation.     Esther Basurto DO  10/15/2024  8:12 AM

## 2024-10-28 ENCOUNTER — TELEPHONE (OUTPATIENT)
Dept: INTERNAL MEDICINE CLINIC | Facility: CLINIC | Age: 79
End: 2024-10-28

## 2024-10-29 NOTE — TELEPHONE ENCOUNTER
Previously prescribed by . Patient has now established care with [facility]. To  to please review and advise on the pending refill request

## 2024-12-09 ENCOUNTER — TELEPHONE (OUTPATIENT)
Dept: SURGERY | Facility: CLINIC | Age: 79
End: 2024-12-09

## 2024-12-09 NOTE — TELEPHONE ENCOUNTER
Urology staff,  This patient was with his wife earlier today.  He has a history of BPH and had a greenlight laser procedure on him December 2023.  Continues to have nocturia x 2-3.  He is scheduled to see me for an outpatient visit for follow-up in February but I would like to schedule him for a cystoscopy instead.  Please call the patient and arrange.  It can be done next available prior to his scheduled appointment in February.

## 2024-12-09 NOTE — TELEPHONE ENCOUNTER
Spoke with Summer Kate and introduced myself and patients name and . I offered him an appointment for a cystoscopy on 2025 at 9:30 am for 10:00 am patient aware, I explained before and after procedure process. I asked him if he had additional questions and he stated no, but if something sooner came up he would take it. Call then ended.

## 2025-01-06 ENCOUNTER — TELEPHONE (OUTPATIENT)
Dept: INTERNAL MEDICINE CLINIC | Facility: CLINIC | Age: 80
End: 2025-01-06

## 2025-01-06 NOTE — TELEPHONE ENCOUNTER
Patient's wife Kala is calling to request an order for a Pneumonia vaccine    Once the order is complete please call [phone] Press 1 when prompted    Message in for spouse

## 2025-01-13 ENCOUNTER — TELEPHONE (OUTPATIENT)
Dept: SURGERY | Facility: CLINIC | Age: 80
End: 2025-01-13

## 2025-01-13 ENCOUNTER — PROCEDURE (OUTPATIENT)
Dept: SURGERY | Facility: CLINIC | Age: 80
End: 2025-01-13
Payer: COMMERCIAL

## 2025-01-13 VITALS — DIASTOLIC BLOOD PRESSURE: 68 MMHG | HEART RATE: 68 BPM | SYSTOLIC BLOOD PRESSURE: 166 MMHG

## 2025-01-13 DIAGNOSIS — N40.1 BPH WITH OBSTRUCTION/LOWER URINARY TRACT SYMPTOMS: Primary | ICD-10-CM

## 2025-01-13 DIAGNOSIS — Z01.818 PREOPERATIVE TESTING: ICD-10-CM

## 2025-01-13 DIAGNOSIS — L53.9 ERYTHEMA: ICD-10-CM

## 2025-01-13 DIAGNOSIS — N13.8 BPH WITH OBSTRUCTION/LOWER URINARY TRACT SYMPTOMS: Primary | ICD-10-CM

## 2025-01-13 DIAGNOSIS — N32.89 BLADDER MASS: Primary | ICD-10-CM

## 2025-01-13 DIAGNOSIS — R33.9 URINARY RETENTION: ICD-10-CM

## 2025-01-13 DIAGNOSIS — R82.90 ABNORMAL URINE FINDINGS: ICD-10-CM

## 2025-01-13 NOTE — ADDENDUM NOTE
Addended by: MASON ALVAREZ on: 1/13/2025 10:23 AM     Modules accepted: Orders     Statement Selected

## 2025-01-13 NOTE — PROGRESS NOTES
Yves Love is a 79 year old male.    HPI:     Chief Complaint   Patient presents with    BPH     Cystoscopy       79-year-old male presents for office cystoscopy and follow-up to visit 2024.  Has a chronic history of BPH, lower urinary tract symptoms.  Underwent greenlight laser vaporization of the prostate 2023.  His obstructive symptoms improved including a better force of stream he has had intermittent constipation and this appears to be improved on MiraLAX every other day or every 2 to 3 days.  He is here today for cystoscopy to assess irritative urination symptoms.  Bladder scan for postvoid residual volume 2024 was moderately elevated at 239 mL.  I have prescribed mirabegron previously but he states he was unable to obtain because of costs incurred.  He denies any gross hematuria or dysuria today.      HISTORY:  Past Medical History:    Arthritis    Coronary atherosclerosis    Diabetes (HCC)    Esophageal reflux    High blood pressure    High cholesterol    Hyperlipidemia    S/P TURP (transurethral resection of prostate)    Unspecified essential hypertension    Visual impairment    reading      Past Surgical History:   Procedure Laterality Date    Adenoidectomy      As a young child    Cath bare metal stent (bms)  2023    patient goes to outpatient cardiac rehab on     Colonoscopy  5 yrs ago    Done by Dr. Trevino at [place]    Hand/finger surgery unlisted Right 2013    R thumb reconstructive surgery - successful    Knee arthroscopy Right 2003    Other surgical history  2008    Dr. Holman @ [facility]    T&a      Tonsillectomy  Do not recall..    As a child      Family History   Problem Relation Age of Onset    Stroke Mother 65        /blood clot to the brain    Hypertension Mother         Martha Mataeli (maiden name)    Cancer Father         Legal name was Abundio (Slovenian for Dustin)    Cancer Sister 40    Cancer Sister          Brain cancer ()    Cancer Brother          / colan cancer    Diabetes Maternal Grandmother     Testicular Cancer Son       Social History:   Social History     Socioeconomic History    Marital status:    Tobacco Use    Smoking status: Former     Current packs/day: 0.00     Average packs/day: 1 pack/day for 50.0 years (50.0 ttl pk-yrs)     Types: Cigarettes     Start date: 6/15/1959     Quit date: 6/15/2009     Years since quitting: 15.5    Smokeless tobacco: Never   Vaping Use    Vaping status: Never Used   Substance and Sexual Activity    Alcohol use: Yes     Alcohol/week: 2.0 standard drinks of alcohol     Types: 2 Cans of beer per week     Comment: Socially and while grilling    Drug use: Never   Other Topics Concern    Caffeine Concern Yes     Comment: Coffee 3 cups daily        Medications (Active prior to today's visit):  Current Outpatient Medications   Medication Sig Dispense Refill    metFORMIN 500 MG Oral Tab Take 1 tablet (500 mg total) by mouth daily with breakfast. 90 tablet 3    atorvastatin 20 MG Oral Tab Take 1 tablet (20 mg total) by mouth nightly. 90 tablet 3    Lancets (ONETOUCH DELICA PLUS [id number]) Does not apply Misc USE FOR BLOOD SUGAR TESTING DAILY 100 each 3    Glucose Blood (ONETOUCH VERIO) In Vitro Strip Use for blood sugar testing daily 100 strip 3    enalapril 10 MG Oral Tab Take 1 tablet (10 mg total) by mouth 2 (two) times daily.      Blood Glucose Monitoring Suppl (ONETOUCH VERIO IQ SYSTEM) w/Device Does not apply Kit Use for blood sugar testing daily 1 kit 1    aspirin 81 MG Oral Tab EC Take 1 tablet (81 mg total) by mouth daily.      clopidogrel 75 MG Oral Tab Take 1 tablet (75 mg total) by mouth daily. (Patient not taking: Reported on 2025)         Allergies:  Allergies[1]      ROS:       PHYSICAL EXAM:   Yves Love  : 1945  Referring Physician: No ref. provider found     Chief Complaint   Patient presents with    BPH      Cystoscopy           CYSTOSCOPY    Anesthesia:  2% lidocaine gel    Urethra: Normal  Prostate / Pelvic: Prostate open status post greenlight laser vaporization without any obstructive tissue grossly  Bladder: Normal.  There are number of well-demarcated erythematous patches along the bladder floor and posterior wall measuring about 1 to 2 cm each in size.  Pictures taken and submitted into the chart.  U.O's: Normal  Trabeculation: none      POST CYSTOSCOPY MEDICATIONS: sample one tablet Cipro 500mg given to patient    DIAGNOSIS:     PLAN: see below    Bladder scan for postvoid residual volume 152 mL  ASSESSMENT/PLAN:   Assessment   Encounter Diagnoses   Name Primary?    BPH with obstruction/lower urinary tract symptoms Yes    Urinary retention        Reviewed findings at length with patient.  Recommended cystoscopy under anesthesia, biopsy and fulguration to rule out carcinoma in situ.  The rationale for this testing was discussed with the patient.  The risks and side effects were reviewed.  He will need preoperative cardiac clearance.  He may remain on low-dose aspirin if he wishes.  He updates me that his clopidogrel was discontinued recently by his cardiologist.  He will be scheduled accordingly.         Orders This Visit:  No orders of the defined types were placed in this encounter.      Meds This Visit:  Requested Prescriptions      No prescriptions requested or ordered in this encounter       Imaging & Referrals:  None     1/13/2025  Sebastian Arceo MD               [1]   Allergies  Allergen Reactions    Bees SWELLING

## 2025-01-14 LAB — NON GYNE INTERPRETATION: NEGATIVE

## 2025-01-14 NOTE — TELEPHONE ENCOUNTER
Pt contacted and scheduled for surgery with Dr. Arceo on 2/5/25.  Pt aware to contact Cardiologist at [facility] to schedule cardiac clearance and complete preoperative testing 7-10 days prior to surgery.     2wk f/u scheduled for 2/20/25 at 1:50p with Dr. Arceo     Instructions sent to pt thru portal.

## 2025-01-24 ENCOUNTER — LAB ENCOUNTER (OUTPATIENT)
Dept: LAB | Facility: HOSPITAL | Age: 80
End: 2025-01-24
Attending: UROLOGY
Payer: MEDICARE

## 2025-01-24 DIAGNOSIS — R82.90 ABNORMAL URINE FINDINGS: ICD-10-CM

## 2025-01-24 DIAGNOSIS — Z01.818 PREOPERATIVE TESTING: ICD-10-CM

## 2025-01-24 LAB
ANION GAP SERPL CALC-SCNC: 7 MMOL/L (ref 0–18)
ATRIAL RATE: 57 BPM
BASOPHILS # BLD AUTO: 0.03 X10(3) UL (ref 0–0.2)
BASOPHILS NFR BLD AUTO: 0.4 %
BUN BLD-MCNC: 16 MG/DL (ref 9–23)
BUN/CREAT SERPL: 13.2 (ref 10–20)
CALCIUM BLD-MCNC: 9.4 MG/DL (ref 8.7–10.4)
CHLORIDE SERPL-SCNC: 104 MMOL/L (ref 98–112)
CO2 SERPL-SCNC: 31 MMOL/L (ref 21–32)
CREAT BLD-MCNC: 1.21 MG/DL
DEPRECATED RDW RBC AUTO: 45.2 FL (ref 35.1–46.3)
EGFRCR SERPLBLD CKD-EPI 2021: 61 ML/MIN/1.73M2 (ref 60–?)
EOSINOPHIL # BLD AUTO: 0.14 X10(3) UL (ref 0–0.7)
EOSINOPHIL NFR BLD AUTO: 1.9 %
ERYTHROCYTE [DISTWIDTH] IN BLOOD BY AUTOMATED COUNT: 13.2 % (ref 11–15)
FASTING STATUS PATIENT QL REPORTED: YES
GLUCOSE BLD-MCNC: 99 MG/DL (ref 70–99)
HCT VFR BLD AUTO: 42.2 %
HGB BLD-MCNC: 14 G/DL
IMM GRANULOCYTES # BLD AUTO: 0.01 X10(3) UL (ref 0–1)
IMM GRANULOCYTES NFR BLD: 0.1 %
LYMPHOCYTES # BLD AUTO: 2.74 X10(3) UL (ref 1–4)
LYMPHOCYTES NFR BLD AUTO: 36.9 %
MCH RBC QN AUTO: 30.8 PG (ref 26–34)
MCHC RBC AUTO-ENTMCNC: 33.2 G/DL (ref 31–37)
MCV RBC AUTO: 93 FL
MONOCYTES # BLD AUTO: 0.81 X10(3) UL (ref 0.1–1)
MONOCYTES NFR BLD AUTO: 10.9 %
NEUTROPHILS # BLD AUTO: 3.69 X10 (3) UL (ref 1.5–7.7)
NEUTROPHILS # BLD AUTO: 3.69 X10(3) UL (ref 1.5–7.7)
NEUTROPHILS NFR BLD AUTO: 49.8 %
OSMOLALITY SERPL CALC.SUM OF ELEC: 295 MOSM/KG (ref 275–295)
P AXIS: 55 DEGREES
P-R INTERVAL: 158 MS
PLATELET # BLD AUTO: 173 10(3)UL (ref 150–450)
POTASSIUM SERPL-SCNC: 4.5 MMOL/L (ref 3.5–5.1)
Q-T INTERVAL: 430 MS
QRS DURATION: 94 MS
QTC CALCULATION (BEZET): 418 MS
R AXIS: -27 DEGREES
RBC # BLD AUTO: 4.54 X10(6)UL
SODIUM SERPL-SCNC: 142 MMOL/L (ref 136–145)
T AXIS: 41 DEGREES
VENTRICULAR RATE: 57 BPM
WBC # BLD AUTO: 7.4 X10(3) UL (ref 4–11)

## 2025-01-24 PROCEDURE — 87086 URINE CULTURE/COLONY COUNT: CPT

## 2025-01-24 PROCEDURE — 80048 BASIC METABOLIC PNL TOTAL CA: CPT

## 2025-01-24 PROCEDURE — 85025 COMPLETE CBC W/AUTO DIFF WBC: CPT

## 2025-01-24 PROCEDURE — 93010 ELECTROCARDIOGRAM REPORT: CPT | Performed by: INTERNAL MEDICINE

## 2025-01-24 PROCEDURE — 93005 ELECTROCARDIOGRAM TRACING: CPT

## 2025-01-24 PROCEDURE — 36415 COLL VENOUS BLD VENIPUNCTURE: CPT

## 2025-02-04 NOTE — DISCHARGE INSTRUCTIONS
HOME INSTRUCTIONS  No heavy lifting of more than 50 pounds for 2 weeks.    May resume low-dose aspirin Friday at the earliest if his urine is pink or better in color.  If urine continues to be darker, would hold until Saturday.    After the procedure  If you had a sedative, general anesthesia, or spinal anesthesia, you must have someone drive you home. Once you’re home:  Drink plenty of fluids.  You may have burning or light bleeding when you pee. This is normal.  Medicines may be prescribed to ease any mild pain or prevent infection. Take these as directed.    When to call your healthcare provider  Call your healthcare provider if you have any of the following after the procedure:  Heavy bleeding or blood clots  Burning that lasts more than 1 day  Fever of   100° F  ( 38°C ) or higher, or as directed by your provider  Trouble peeing      AMBSURG HOME CARE INSTRUCTIONS: POST-OP ANESTHESIA  The medication that you received for sedation or general anesthesia can last up to 24 hours. Your judgment and reflexes may be altered, even if you feel like your normal self.      We Recommend:   Do not drive any motor vehicle or bicycle   Avoid mowing the lawn, playing sports, or working with power tools/applicances (power saws, electric knives or mixers)   That you have someone stay with you on your first night home   Do not drink alcohol or take sleeping pills or tranquilizers   Do not sign legal documents within 24 hours of your procedure   If you had a nerve block for your surgery, take extra care not to put any pressure on your arm or hand for 24 hours    It is normal:  For you to have a sore throat if you had a breathing tube during surgery (while you were asleep!). The sore throat should get better within 48 hours. You can gargle with warm salt water (1/2 tsp in 4 oz warm water) or use a throat lozenge for comfort  To feel muscle aches or soreness especially in the abdomen, chest or neck. The achy feeling should go away  in the next 24 hours  To feel weak, sleepy or \"wiped out\". Your should start feeling better in the next 24 hours.   To experience mild discomforts such as sore lip or tongue, headache, cramps, gas pains or a bloated feeling in your abdomen.   To experience mild back pain or soreness for a day or two if you had spinal or epidural anesthesia.   If you had laparoscopic surgery, to feel shoulder pain or discomfort on the day of surgery.   For some patients to have nausea after surgery/anesthesia    If you feel nausea or experience vomiting:   Try to move around less.   Eat less than usual or drink only liquids until the next morning   Nausea should resolve in about 24 hours    If you have a problem when you are at home:    Call your surgeons office   Discharge Instructions: After Your Surgery  You’ve just had surgery. During surgery, you were given medicine called anesthesia to keep you relaxed and free of pain. After surgery, you may have some pain or nausea. This is common. Here are some tips for feeling better and getting well after surgery.   Going home  Your healthcare provider will show you how to take care of yourself when you go home. They'll also answer your questions. Have an adult family member or friend drive you home. For the first 24 hours after your surgery:   Don't drive or use heavy equipment.  Don't make important decisions or sign legal papers.  Take medicines as directed.  Don't drink alcohol.  Have someone stay with you, if needed. They can watch for problems and help keep you safe.  Be sure to go to all follow-up visits with your healthcare provider. And rest after your surgery for as long as your provider tells you to.   Coping with pain  If you have pain after surgery, pain medicine will help you feel better. Take it as directed, before pain becomes severe. Also, ask your healthcare provider or pharmacist about other ways to control pain. This might be with heat, ice, or relaxation. And follow  any other instructions your surgeon or nurse gives you.      Stay on schedule with your medicine.     Tips for taking pain medicine  To get the best relief possible, remember these points:   Pain medicines can upset your stomach. Taking them with a little food may help.  Most pain relievers taken by mouth need at least 20 to 30 minutes to start to work.  Don't wait till your pain becomes severe before you take your medicine. Try to time your medicine so that you can take it before starting an activity. This might be before you get dressed, go for a walk, or sit down for dinner.  Constipation is a common side effect of some pain medicines. Call your healthcare provider before taking any medicines such as laxatives or stool softeners to help ease constipation. Also ask if you should skip any foods. Drinking lots of fluids and eating foods such as fruits and vegetables that are high in fiber can also help. Remember, don't take laxatives unless your surgeon has prescribed them.  Drinking alcohol and taking pain medicine can cause dizziness and slow your breathing. It can even be deadly. Don't drink alcohol while taking pain medicine.  Pain medicine can make you react more slowly to things. Don't drive or run machinery while taking pain medicine.  Your healthcare provider may tell you to take acetaminophen to help ease your pain. Ask them how much you're supposed to take each day. Acetaminophen or other pain relievers may interact with your prescription medicines or other over-the-counter (OTC) medicines. Some prescription medicines have acetaminophen and other ingredients in them. Using both prescription and OTC acetaminophen for pain can cause you to accidentally overdose. Read the labels on your OTC medicines with care. This will help you to clearly know the list of ingredients, how much to take, and any warnings. It may also help you not take too much acetaminophen. If you have questions or don't understand the  information, ask your pharmacist or healthcare provider to explain it to you before you take the OTC medicine.   Managing nausea  Some people have an upset stomach (nausea) after surgery. This is often because of anesthesia, pain, or pain medicine, less movement of food in the stomach, or the stress of surgery. These tips will help you handle nausea and eat healthy foods as you get better. If you were on a special food plan before surgery, ask your healthcare provider if you should follow it while you get better. Check with your provider on how your eating should progress. It may depend on the surgery you had. These general tips may help:   Don't push yourself to eat. Your body will tell you when to eat and how much.  Start off with clear liquids and soup. They're easier to digest.  Next try semi-solid foods as you feel ready. These include mashed potatoes, applesauce, and gelatin.  Slowly move to solid foods. Don’t eat fatty, rich, or spicy foods at first.  Don't force yourself to have 3 large meals a day. Instead eat smaller amounts more often.  Take pain medicines with a small amount of solid food, such as crackers or toast. This helps prevent nausea.  When to call your healthcare provider  Call your healthcare provider right away if you have any of these:   You still have too much pain, or the pain gets worse, after taking the medicine. The medicine may not be strong enough. Or there may be a complication from the surgery.  You feel too sleepy, dizzy, or groggy. The medicine may be too strong.  Side effects such as nausea or vomiting. Your healthcare provider may advise taking other medicines to .  Skin changes such as rash, itching, or hives. This may mean you have an allergic reaction. Your provider may advise taking other medicines.  The incision looks different (for instance, part of it opens up).  Bleeding or fluid leaking from the incision site, and weren't told to expect that.  Fever of 100.4°F (38°C) or  higher, or as directed by your provider.  Call 911  Call 911 right away if you have:   Trouble breathing  Facial swelling    If you have obstructive sleep apnea   You were given anesthesia medicine during surgery to keep you comfortable and free of pain. After surgery, you may have more apnea spells because of this medicine and other medicines you were given. The spells may last longer than normal.    At home:  Keep using the continuous positive airway pressure (CPAP) device when you sleep. Unless your healthcare provider tells you not to, use it when you sleep, day or night. CPAP is a common device used to treat obstructive sleep apnea.  Talk with your provider before taking any pain medicine, muscle relaxants, or sedatives. Your provider will tell you about the possible dangers of taking these medicines.  Contact your provider if your sleeping changes a lot even when taking medicines as directed.  Ivana last reviewed this educational content on 10/1/2021  © 9715-6677 [employer]. All rights reserved. This information is not intended as a substitute for professional medical care. Always follow your healthcare professional's instructions.

## 2025-02-05 ENCOUNTER — ANESTHESIA (OUTPATIENT)
Dept: SURGERY | Facility: HOSPITAL | Age: 80
End: 2025-02-05
Payer: MEDICARE

## 2025-02-05 ENCOUNTER — HOSPITAL ENCOUNTER (OUTPATIENT)
Facility: HOSPITAL | Age: 80
Setting detail: HOSPITAL OUTPATIENT SURGERY
Discharge: HOME | End: 2025-02-05
Attending: UROLOGY | Admitting: UROLOGY
Payer: MEDICARE

## 2025-02-05 ENCOUNTER — ANESTHESIA EVENT (OUTPATIENT)
Dept: SURGERY | Facility: HOSPITAL | Age: 80
End: 2025-02-05
Payer: MEDICARE

## 2025-02-05 VITALS
HEIGHT: 70 IN | RESPIRATION RATE: 18 BRPM | WEIGHT: 185 LBS | OXYGEN SATURATION: 99 % | HEART RATE: 68 BPM | BODY MASS INDEX: 26.48 KG/M2 | DIASTOLIC BLOOD PRESSURE: 59 MMHG | SYSTOLIC BLOOD PRESSURE: 126 MMHG | TEMPERATURE: 98 F

## 2025-02-05 DIAGNOSIS — N32.89 BLADDER MASS: ICD-10-CM

## 2025-02-05 DIAGNOSIS — L53.9 ERYTHEMA: ICD-10-CM

## 2025-02-05 LAB
GLUCOSE BLDC GLUCOMTR-MCNC: 101 MG/DL (ref 70–99)
GLUCOSE BLDC GLUCOMTR-MCNC: 73 MG/DL (ref 70–99)

## 2025-02-05 PROCEDURE — 0T5B8ZZ DESTRUCTION OF BLADDER, VIA NATURAL OR ARTIFICIAL OPENING ENDOSCOPIC: ICD-10-PCS | Performed by: UROLOGY

## 2025-02-05 PROCEDURE — 52214 CYSTOSCOPY AND TREATMENT: CPT | Performed by: UROLOGY

## 2025-02-05 RX ORDER — LIDOCAINE HYDROCHLORIDE 20 MG/ML
JELLY TOPICAL AS NEEDED
Status: DISCONTINUED | OUTPATIENT
Start: 2025-02-05 | End: 2025-02-05 | Stop reason: HOSPADM

## 2025-02-05 RX ORDER — ACETAMINOPHEN 500 MG
1000 TABLET ORAL ONCE
Status: COMPLETED | OUTPATIENT
Start: 2025-02-05 | End: 2025-02-05

## 2025-02-05 RX ORDER — NICOTINE POLACRILEX 4 MG
30 LOZENGE BUCCAL
Status: DISCONTINUED | OUTPATIENT
Start: 2025-02-05 | End: 2025-02-05

## 2025-02-05 RX ORDER — PHENAZOPYRIDINE HYDROCHLORIDE 200 MG/1
200 TABLET, FILM COATED ORAL ONCE
Status: CANCELLED | OUTPATIENT
Start: 2025-02-05 | End: 2025-02-05

## 2025-02-05 RX ORDER — IPRATROPIUM BROMIDE AND ALBUTEROL SULFATE 2.5; .5 MG/3ML; MG/3ML
3 SOLUTION RESPIRATORY (INHALATION) EVERY 6 HOURS PRN
Status: DISCONTINUED | OUTPATIENT
Start: 2025-02-05 | End: 2025-02-05

## 2025-02-05 RX ORDER — SODIUM CHLORIDE, SODIUM LACTATE, POTASSIUM CHLORIDE, CALCIUM CHLORIDE 600; 310; 30; 20 MG/100ML; MG/100ML; MG/100ML; MG/100ML
INJECTION, SOLUTION INTRAVENOUS CONTINUOUS
Status: DISCONTINUED | OUTPATIENT
Start: 2025-02-05 | End: 2025-02-05

## 2025-02-05 RX ORDER — CEFADROXIL 500 MG/1
500 CAPSULE ORAL 2 TIMES DAILY
Qty: 6 CAPSULE | Refills: 0 | Status: SHIPPED | OUTPATIENT
Start: 2025-02-05 | End: 2025-02-08

## 2025-02-05 RX ORDER — ONDANSETRON 2 MG/ML
4 INJECTION INTRAMUSCULAR; INTRAVENOUS EVERY 6 HOURS PRN
Status: DISCONTINUED | OUTPATIENT
Start: 2025-02-05 | End: 2025-02-05

## 2025-02-05 RX ORDER — DEXTROSE MONOHYDRATE 25 G/50ML
50 INJECTION, SOLUTION INTRAVENOUS
Status: DISCONTINUED | OUTPATIENT
Start: 2025-02-05 | End: 2025-02-05

## 2025-02-05 RX ORDER — HYDROMORPHONE HYDROCHLORIDE 1 MG/ML
0.2 INJECTION, SOLUTION INTRAMUSCULAR; INTRAVENOUS; SUBCUTANEOUS EVERY 5 MIN PRN
Status: DISCONTINUED | OUTPATIENT
Start: 2025-02-05 | End: 2025-02-05

## 2025-02-05 RX ORDER — LABETALOL HYDROCHLORIDE 5 MG/ML
5 INJECTION, SOLUTION INTRAVENOUS EVERY 5 MIN PRN
Status: DISCONTINUED | OUTPATIENT
Start: 2025-02-05 | End: 2025-02-05

## 2025-02-05 RX ORDER — NALOXONE HYDROCHLORIDE 0.4 MG/ML
0.08 INJECTION, SOLUTION INTRAMUSCULAR; INTRAVENOUS; SUBCUTANEOUS AS NEEDED
Status: DISCONTINUED | OUTPATIENT
Start: 2025-02-05 | End: 2025-02-05

## 2025-02-05 RX ORDER — ONDANSETRON 2 MG/ML
INJECTION INTRAMUSCULAR; INTRAVENOUS AS NEEDED
Status: DISCONTINUED | OUTPATIENT
Start: 2025-02-05 | End: 2025-02-05 | Stop reason: SURG

## 2025-02-05 RX ORDER — NICOTINE POLACRILEX 4 MG
15 LOZENGE BUCCAL
Status: DISCONTINUED | OUTPATIENT
Start: 2025-02-05 | End: 2025-02-05

## 2025-02-05 RX ORDER — HYDROMORPHONE HYDROCHLORIDE 1 MG/ML
0.4 INJECTION, SOLUTION INTRAMUSCULAR; INTRAVENOUS; SUBCUTANEOUS EVERY 5 MIN PRN
Status: DISCONTINUED | OUTPATIENT
Start: 2025-02-05 | End: 2025-02-05

## 2025-02-05 RX ORDER — EPHEDRINE SULFATE 50 MG/ML
INJECTION INTRAVENOUS AS NEEDED
Status: DISCONTINUED | OUTPATIENT
Start: 2025-02-05 | End: 2025-02-05 | Stop reason: SURG

## 2025-02-05 RX ADMIN — ONDANSETRON 4 MG: 2 INJECTION INTRAMUSCULAR; INTRAVENOUS at 09:46:00

## 2025-02-05 RX ADMIN — EPHEDRINE SULFATE 2.5 MG: 50 INJECTION INTRAVENOUS at 09:52:00

## 2025-02-05 RX ADMIN — SODIUM CHLORIDE, SODIUM LACTATE, POTASSIUM CHLORIDE, CALCIUM CHLORIDE: 600; 310; 30; 20 INJECTION, SOLUTION INTRAVENOUS at 10:13:00

## 2025-02-05 NOTE — OPERATIVE REPORT
[facility]  part of [facility]    Operative Note     Yves Love Location: OR   CSN [id number] MRN [id number]   Admission Date 2/5/2025 Operation Date 2/5/2025   Attending Physician Sebastian Arceo MD Operating Physician Sebastian Arceo MD      Preoperative Diagnosis: Bladder mass [N32.89]  Erythema [L53.9]     Postoperative Diagnosis: Bladder mass [N32.89]Erythema [L53.9]     Procedure Performed:   Cystoscopy, biopsy, fulguration     Primary Surgeon: Sebastian Arceo MD      Assistant: None     Surgical Findings: Erythematous areas seen mostly along the high posterior bladder wall and dome.  Areas each measured about 2 cm in size.  They were biopsied and fulgurated.     Anesthesia: General     Complications: None     Implants: * No implants in log *     Specimen: Bladder biopsy x 3, 2 from the high posterior wall and one from the dome     Drains: None     Condition: Stable     Estimated Blood Loss: No data recorded     Summary of Case: After consent was obtained and preoperative antibiotics administered the patient was brought into the operating room.  Anesthesia was administered and he was placed in the dorsolithotomy position.  The groin was prepped and draped in the usual sterile standard fashion.  22 [place]i rigid cystoscope was placed per urethra.  Findings are noted in the findings section of this report.  The erythematous areas in question were identified.  The high posterior wall lesion was biopsied x 2.  The dome lesion was biopsied x 1.  Each of those areas were completely fulgurated.  No additional suspicious areas were seen.  The bladder was emptied.  No active bleeding was seen.  The bladder cystoscope was removed.  2% viscous lidocaine was instilled per urethra.  The patient was placed in the supine position awakened extubated and taken out to the postanesthesia care in stable condition.  I was present and performed the entirety of his procedure.  There were no perioperative or  immediate postop complications.     Sebastian Arceo MD  2/5/2025  10:05 AM

## 2025-02-05 NOTE — ANESTHESIA PREPROCEDURE EVALUATION
Anesthesia PreOp Note    HPI:     Yves Love is a 79 year old male who presents for preoperative consultation requested by: Sebastian Arceo MD    Date of Surgery: 2/5/2025    Procedure(s):  Cystoscopy, biopsy, fulguration  Indication: Bladder mass [N32.89]  Erythema [L53.9]    Relevant Problems   No relevant active problems       NPO:  Last Liquid Consumption Date: 02/04/25  Last Liquid Consumption Time: 1815  Last Solid Consumption Date: 02/04/25  Last Solid Consumption Time: 1800  Last Liquid Consumption Date: 02/04/25          History Review:  Patient Active Problem List    Diagnosis Date Noted    PVD (pulmonary valve disease) 05/01/2023    Prostatism 06/27/2022    Bursitis of right shoulder 12/15/2021    Smokers' cough (HCC) 09/29/2020    Hypercholesteremia 09/29/2020    Claudication of left lower extremity 07/01/2020    Nodule of apex of right lung 06/29/2020    Type 2 diabetes mellitus with complication, without long-term current use of insulin ([facility]) 06/29/2020    PVD (peripheral vascular disease) 06/29/2020    CKD (chronic kidney disease) stage 3, GFR 30-59 ml/min ([facility]) 06/29/2020    Encounter for Medicare annual wellness exam 03/13/2017    Essential hypertension 01/20/2012    S/P TURP (transurethral resection of prostate) 2007       Past Medical History:    Arthritis    Coronary atherosclerosis    Diabetes (HCC)    Esophageal reflux    High blood pressure    High cholesterol    Hyperlipidemia    Peripheral vascular disease    S/P TURP (transurethral resection of prostate)    Unspecified essential hypertension    Visual impairment    reading       Past Surgical History:   Procedure Laterality Date    Adenoidectomy      As a young child    Cath bare metal stent (bms)  07/2023    patient goes to outpatient cardiac rehab on mon wed fri    Colonoscopy  5 yrs ago    Done by Dr. Trevino at [facility]    Hand/finger surgery unlisted Right 01/01/2013    R thumb reconstructive surgery - successful    Ir  angioplasty lower ext. endo revasc Bilateral     stent placement    Knee arthroscopy Right 2003    Other surgical history  2008    Dr. Holman @ [facility]    T&a      Tonsillectomy  Do not recall..    As a child       Prescriptions Prior to Admission[1]  Current Medications and Prescriptions Ordered in Epic[2]    Allergies[3]    Family History   Problem Relation Age of Onset    Cancer Father         Legal name was Abundio (Marshallese for Dustin)    Stroke Mother 65        /blood clot to the brain    Hypertension Mother         Martha Pratt (maiden name)    Testicular Cancer Son     Diabetes Maternal Grandmother     Cancer Sister 40    Cancer Sister         Brain cancer ()    Cancer Brother          / colon cancer     Social History     Socioeconomic History    Marital status:    Tobacco Use    Smoking status: Former     Current packs/day: 0.00     Average packs/day: 1 pack/day for 50.0 years (50.0 ttl pk-yrs)     Types: Cigarettes     Start date: 6/15/1959     Quit date: 6/15/2009     Years since quitting: 15.6    Smokeless tobacco: Never   Vaping Use    Vaping status: Never Used   Substance and Sexual Activity    Alcohol use: Yes     Alcohol/week: 2.0 standard drinks of alcohol     Types: 2 Cans of beer per week     Comment: Socially and while grilling    Drug use: Never   Other Topics Concern    Caffeine Concern Yes     Comment: Coffee 3 cups daily       Available pre-op labs reviewed.  Lab Results   Component Value Date    WBC 7.4 2025    RBC 4.54 2025    HGB 14.0 2025    HCT 42.2 2025    MCV 93.0 2025    MCH 30.8 2025    MCHC 33.2 2025    RDW 13.2 2025    .0 2025     Lab Results   Component Value Date     2025    K 4.5 2025     2025    CO2 31.0 2025    BUN 16 2025    CREATSERUM 1.21 2025    GLU 99 2025    CA 9.4 2025          Vital  Signs:  Body mass index is 26.54 kg/m².   height is 1.778 m (5' 10\") and weight is 83.9 kg (185 lb). His oral temperature is 98.2 °F (36.8 °C). His blood pressure is 158/81 and his pulse is 64. His respiration is 18 and oxygen saturation is 100%.   Vitals:    01/31/25 1749 02/05/25 0901   BP:  158/81   Pulse:  64   Resp:  18   Temp:  98.2 °F (36.8 °C)   TempSrc:  Oral   SpO2:  100%   Weight: 83.9 kg (185 lb) 83.9 kg (185 lb)   Height: 1.778 m (5' 10\") 1.778 m (5' 10\")        Anesthesia Evaluation     Patient summary reviewed and Nursing notes reviewed    Airway   Mallampati: II  TM distance: >3 FB  Neck ROM: full  Dental    (+) upper dentures and lower dentures    Pulmonary - normal exam    breath sounds clear to auscultation  (+) COPD  (-) asthma, shortness of breath (Pt denies), sleep apnea (Pt denies)    ROS comment:   +50 pack year smoking history; quit 2009  Cardiovascular - normal exam  (+) hypertension, CAD, CABG/stent (PCI of mid LAD with CURTIS x1, kissing balloon angioplasty LAD-diagonal (8/15/23); on plavix)  (-) dysrhythmias, CHF, GONZALEZ (Pt denies)    Rhythm: regular  Rate: normal  ROS comment: PVD    Neuro/Psych    (-) CVA    GI/Hepatic/Renal    (+) GERD, chronic renal disease (CKD-3) CRI    Comments: +BPH, here for green light laser    Endo/Other    (+) diabetes mellitus (on metformin) type 2  Abdominal  - normal exam                 Anesthesia Plan:   ASA:  3  Plan:   General  Monitors and Lines:   BIS  Airway:  LMA  Post-op Pain Management: Oral pain medication and IV analgesics  Plan Comments: Discussed the plan for general anesthesia with patient.  Risks including but not limited to nausea, vomiting, sore throat, reaction to medications, oral injury, chipped or broken tooth, heart problems (including but not limited: arrhythmia and heart attack), lung problems and stroke have been discussed.  All questions have been answered to patient's satisfaction. Patient expressed a thorough understanding and  wishes to proceed.     Informed Consent Plan and Risks Discussed With:  Patient, spouse and child/children      I have informed Yves Love and/or legal guardian or family member of the nature of the anesthetic plan, benefits, risks including possible dental damage if relevant, major complications, and any alternative forms of anesthetic management.   All of the patient's questions were answered to the best of my ability. The patient desires the anesthetic management as planned.  Vanessa Castillo MD  2/5/2025 9:08 AM  Present on Admission:  **None**           [1]   Medications Prior to Admission   Medication Sig Dispense Refill Last Dose/Taking    metFORMIN 500 MG Oral Tab Take 1 tablet (500 mg total) by mouth daily with breakfast. 90 tablet 3 2/4/2025 at  7:00 AM    atorvastatin 20 MG Oral Tab Take 1 tablet (20 mg total) by mouth nightly. 90 tablet 3 2/4/2025 at  8:00 PM    enalapril 10 MG Oral Tab Take 1 tablet (10 mg total) by mouth 2 (two) times daily.   2/4/2025 at  9:00 PM    aspirin 81 MG Oral Tab EC Take 1 tablet (81 mg total) by mouth daily.   1/31/2025    Lancets (ONETOUCH DELICA PLUS [id number]) Does not apply Misc USE FOR BLOOD SUGAR TESTING DAILY 100 each 3     Glucose Blood (ONETOUCH VERIO) In Vitro Strip Use for blood sugar testing daily 100 strip 3     Blood Glucose Monitoring Suppl (ONETOUCH VERIO IQ SYSTEM) w/Device Does not apply Kit Use for blood sugar testing daily 1 kit 1    [2]   Current Facility-Administered Medications Ordered in Epic   Medication Dose Route Frequency Provider Last Rate Last Admin    lactated ringers infusion   Intravenous Continuous Sebastian Arceo MD        acetaminophen (Tylenol Extra Strength) tab 1,000 mg  1,000 mg Oral Once Sebastian Arceo MD        ceFAZolin (Ancef) 2g in 10mL IV syringe premix  2 g Intravenous Once Sebastian Arceo MD        ipratropium-albuterol (Duoneb) 0.5-2.5 (3) MG/3ML inhalation solution 3 mL  3 mL Nebulization Q6H PRN Vanessa Castillo MD          No current Epic-ordered outpatient medications on file.   [3]   Allergies  Allergen Reactions    Bees SWELLING

## 2025-02-05 NOTE — ANESTHESIA PROCEDURE NOTES
Airway  Date/Time: 2/5/2025 9:46 AM  Urgency: Elective      General Information and Staff    Patient location during procedure: OR  Anesthesiologist: Vanessa Castillo MD  Performed: anesthesiologist   Performed by: Vanessa Castillo MD  Authorized by: Vanessa Castillo MD      Indications and Patient Condition  Indications for airway management: anesthesia  Sedation level: deep  Preoxygenated: yes  Patient position: sniffing  Mask difficulty assessment: 0 - not attempted    Final Airway Details  Final airway type: supraglottic airway      Successful airway: classic (Ambu)  Size 4       Number of attempts at approach: 1    Additional Comments  Atraumatic and uneventful

## 2025-02-05 NOTE — ANESTHESIA POSTPROCEDURE EVALUATION
Patient: Yves Love    Procedure Summary       Date: 02/05/25 Room / Location: [facility] MAIN OR  / [facility] MAIN OR    Anesthesia Start: 0939 Anesthesia Stop: 1013    Procedure: Cystoscopy, biopsy, fulguration (Bladder) Diagnosis:       Bladder mass      Erythema      (Bladder mass [N32.89]Erythema [L53.9])    Surgeons: Sebastian Arceo MD Anesthesiologist: Vanessa Castillo MD    Anesthesia Type: general ASA Status: 3            Anesthesia Type: general    Vitals Value Taken Time   /6 02/05/25 1013   Temp 97.8 02/05/25 1013   Pulse 59 02/05/25 1012   Resp 14 02/05/25 1013   SpO2 100 % 02/05/25 1012   Vitals shown include unfiled device data.    [facility] AN Post Evaluation:   Patient Evaluated in PACU  Patient Participation: complete - patient participated  Level of Consciousness: awake  Pain Score: 0  Pain Management: satisfactory to patient  Airway Patency:patent  Yes    Nausea/Vomiting: none  Cardiovascular Status: acceptable and blood pressure returned to baseline  Respiratory Status: acceptable  Postoperative Hydration euvolemic      Vanessa Castillo MD  2/5/2025 10:13 AM

## 2025-02-05 NOTE — INTERVAL H&P NOTE
Pre-op Diagnosis: Bladder mass [N32.89]  Erythema [L53.9]    The above referenced H&P was reviewed by Sebastian Arceo MD on 2/5/2025, the patient was examined and no significant changes have occurred in the patient's condition since the H&P was performed.  I discussed with the patient and/or legal representative the potential benefits, risks and side effects of this procedure; the likelihood of the patient achieving goals; and potential problems that might occur during recuperation.  I discussed reasonable alternatives to the procedure, including risks, benefits and side effects related to the alternatives and risks related to not receiving this procedure.  We will proceed with procedure as planned.

## 2025-02-05 NOTE — H&P
79-year-old male presents for office cystoscopy and follow-up to visit 2024.  Has a chronic history of BPH, lower urinary tract symptoms.  Underwent greenlight laser vaporization of the prostate 2023.  His obstructive symptoms improved including a better force of stream he has had intermittent constipation and this appears to be improved on MiraLAX every other day or every 2 to 3 days.  He is here today for cystoscopy to assess irritative urination symptoms.  Bladder scan for postvoid residual volume 2024 was moderately elevated at 239 mL.  I have prescribed mirabegron previously but he states he was unable to obtain because of costs incurred.  He denies any gross hematuria or dysuria today.        HISTORY:  Past Medical History       Past Medical History:    Arthritis    Coronary atherosclerosis    Diabetes (HCC)    Esophageal reflux    High blood pressure    High cholesterol    Hyperlipidemia    S/P TURP (transurethral resection of prostate)    Unspecified essential hypertension    Visual impairment     reading         Past Surgical History         Past Surgical History:   Procedure Laterality Date    Adenoidectomy         As a young child    Cath bare metal stent (bms)   2023     patient goes to outpatient cardiac rehab on     Colonoscopy   5 yrs ago     Done by Dr. Trevino at [place]    Hand/finger surgery unlisted Right 2013     R thumb reconstructive surgery - successful    Knee arthroscopy Right 2003    Other surgical history   2008     Dr. Holman @ [facility]    T&a        Tonsillectomy   Do not recall..     As a child         Family History         Family History   Problem Relation Age of Onset    Stroke Mother 65         /blood clot to the brain    Hypertension Mother           Martha Caldwellcintia (maiden name)    Cancer Father           Legal name was Abundio (Indian for Dustin)    Cancer Sister 40    Cancer Sister            Brain cancer ()    Cancer Brother            / colan cancer    Diabetes Maternal Grandmother      Testicular Cancer Son           Social History:   Short Social Hx on File   Social History            Socioeconomic History    Marital status:    Tobacco Use    Smoking status: Former       Current packs/day: 0.00       Average packs/day: 1 pack/day for 50.0 years (50.0 ttl pk-yrs)       Types: Cigarettes       Start date: 6/15/1959       Quit date: 6/15/2009       Years since quitting: 15.5    Smokeless tobacco: Never   Vaping Use    Vaping status: Never Used   Substance and Sexual Activity    Alcohol use: Yes       Alcohol/week: 2.0 standard drinks of alcohol       Types: 2 Cans of beer per week       Comment: Socially and while grilling    Drug use: Never   Other Topics Concern    Caffeine Concern Yes       Comment: Coffee 3 cups daily            Medications (Active prior to today's visit):  Current Medications          Current Outpatient Medications   Medication Sig Dispense Refill    metFORMIN 500 MG Oral Tab Take 1 tablet (500 mg total) by mouth daily with breakfast. 90 tablet 3    atorvastatin 20 MG Oral Tab Take 1 tablet (20 mg total) by mouth nightly. 90 tablet 3    Lancets (ONETOUCH DELICA PLUS [id number]) Does not apply Misc USE FOR BLOOD SUGAR TESTING DAILY 100 each 3    Glucose Blood (ONETOUCH VERIO) In Vitro Strip Use for blood sugar testing daily 100 strip 3    enalapril 10 MG Oral Tab Take 1 tablet (10 mg total) by mouth 2 (two) times daily.        Blood Glucose Monitoring Suppl (ONETOUCH VERIO IQ SYSTEM) w/Device Does not apply Kit Use for blood sugar testing daily 1 kit 1    aspirin 81 MG Oral Tab EC Take 1 tablet (81 mg total) by mouth daily.        clopidogrel 75 MG Oral Tab Take 1 tablet (75 mg total) by mouth daily. (Patient not taking: Reported on 2025)                Allergies:  [Allergies]    [Allergies]       Allergen Reactions    Bees SWELLING           ROS:       Reviewed and normal  PHYSICAL EXAM:   Yves Love  : 1945  Referring Physician: No ref. provider found           Chief Complaint   Patient presents with    BPH       Cystoscopy               CYSTOSCOPY     Anesthesia:  2% lidocaine gel     Urethra: Normal  Prostate / Pelvic: Prostate open status post greenlight laser vaporization without any obstructive tissue grossly  Bladder: Normal.  There are number of well-demarcated erythematous patches along the bladder floor and posterior wall measuring about 1 to 2 cm each in size.  Pictures taken and submitted into the chart.  U.O's: Normal  Trabeculation: none        POST CYSTOSCOPY MEDICATIONS: sample one tablet Cipro 500mg given to patient     DIAGNOSIS:      PLAN: see below     Bladder scan for postvoid residual volume 152 mL    In no apparent distress  Intact neurologically grossly  Abdomen soft nontender nondistended  Phallus and testicle exams unremarkable.  ASSESSMENT/PLAN:      Assessment       Encounter Diagnoses   Name Primary?    BPH with obstruction/lower urinary tract symptoms Yes    Urinary retention           Reviewed findings at length with patient.  Recommended cystoscopy under anesthesia, biopsy and fulguration to rule out carcinoma in situ.  The rationale for this testing was discussed with the patient.  The risks and side effects were reviewed.  He will need preoperative cardiac clearance.  He may remain on low-dose aspirin if he wishes.  He updates me that his clopidogrel was discontinued recently by his cardiologist.  He will be scheduled accordingly.

## 2025-02-18 ENCOUNTER — TELEPHONE (OUTPATIENT)
Dept: SURGERY | Facility: CLINIC | Age: 80
End: 2025-02-18

## 2025-02-18 NOTE — TELEPHONE ENCOUNTER
Pt  called and stated that he had blood in his urine last night and this morning it was blood and a  large clot. Pt also stated that he a Bx  2 weeks ago for his bladder, Pt has a appointment  on  2/20/25 feels this should be addressed before that

## 2025-02-18 NOTE — TELEPHONE ENCOUNTER
-S/w pt; identity verified with name & .  -On 25, pt had surgery w/ Dr. Arceo; instructed no heavy lifting X2weeks.  -Pt reports on 25 & 25 he went bowling; lifts 3-ball bag from car to wheel carrier.  -Last night (25 after bowling) pt noted hematuria (\"blood red\") & passed \"quarter-size\" clot.  -This a.m. he is hydrating & notes \"urine color is now fruitpunch.\"  -[facility] made aware of above with following orders:  \"No lifting for 2-weeks.\"  -Outcome pending.    -10:50am pt called for update & he reports his urine is yellow & clear; \"I learned a valuable lesson.\"  -Encounter complete.

## 2025-02-20 ENCOUNTER — OFFICE VISIT (OUTPATIENT)
Dept: SURGERY | Facility: CLINIC | Age: 80
End: 2025-02-20

## 2025-02-20 DIAGNOSIS — N40.1 BPH WITH OBSTRUCTION/LOWER URINARY TRACT SYMPTOMS: ICD-10-CM

## 2025-02-20 DIAGNOSIS — N13.8 BPH WITH OBSTRUCTION/LOWER URINARY TRACT SYMPTOMS: ICD-10-CM

## 2025-02-20 DIAGNOSIS — N32.89 BLADDER MASS: Primary | ICD-10-CM

## 2025-02-20 PROCEDURE — 1159F MED LIST DOCD IN RCRD: CPT | Performed by: UROLOGY

## 2025-02-20 PROCEDURE — 99213 OFFICE O/P EST LOW 20 MIN: CPT | Performed by: UROLOGY

## 2025-02-20 NOTE — PROGRESS NOTES
Yves Love is a 79 year old male.    HPI:     Chief Complaint   Patient presents with    BPH     25 [facility] Cystoscopy, biopsy, fulguration pt states urination has been better since. Pt did have some blood earlier this week but has cleared up          79-year-old male status post cystoscopy, biopsy, fulguration for an erythematous area along the high posterior bladder wall at the dome performed 2025 which was unremarkable for any cancer lesions.  Presents in follow-up.  Remains on low-dose aspirin.  2 days ago he was bowling and lifted a heavy bag he states he had 24 hours worth of gross painless hematuria.  This has since resolved.  He denies any bothersome irritative or obstructive voiding symptoms.      HISTORY:  Past Medical History:    Arthritis    Coronary atherosclerosis    Diabetes (HCC)    Esophageal reflux    High blood pressure    High cholesterol    Hyperlipidemia    Peripheral vascular disease    S/P TURP (transurethral resection of prostate)    Unspecified essential hypertension    Visual impairment    reading      Past Surgical History:   Procedure Laterality Date    Adenoidectomy      As a young child    Cath bare metal stent (bms)  2023    patient goes to outpatient cardiac rehab on     Colonoscopy  5 yrs ago    Done by Dr. Trevino at [place]    Hand/finger surgery unlisted Right 2013    R thumb reconstructive surgery - successful    Ir angioplasty lower ext. endo revasc Bilateral     stent placement    Knee arthroscopy Right 2003    Other surgical history  2008    Dr. Holman @ [facility]    T&a      Tonsillectomy  Do not recall..    As a child      Family History   Problem Relation Age of Onset    Cancer Father         Legal name was Abundio (Czech for Dustin)    Stroke Mother 65        /blood clot to the brain    Hypertension Mother         Martha Pratt (maiden name)    Testicular Cancer Son     Diabetes Maternal  Grandmother     Cancer Sister 40    Cancer Sister         Brain cancer ()    Cancer Brother          / colon cancer      Social History:   Social History     Socioeconomic History    Marital status:    Tobacco Use    Smoking status: Former     Current packs/day: 0.00     Average packs/day: 1 pack/day for 50.0 years (50.0 ttl pk-yrs)     Types: Cigarettes     Start date: 6/15/1959     Quit date: 6/15/2009     Years since quitting: 15.6    Smokeless tobacco: Never   Vaping Use    Vaping status: Never Used   Substance and Sexual Activity    Alcohol use: Yes     Alcohol/week: 2.0 standard drinks of alcohol     Types: 2 Cans of beer per week     Comment: Socially and while grilling    Drug use: Never   Other Topics Concern    Caffeine Concern Yes     Comment: Coffee 3 cups daily        Medications (Active prior to today's visit):  Current Outpatient Medications   Medication Sig Dispense Refill    metFORMIN 500 MG Oral Tab Take 1 tablet (500 mg total) by mouth daily with breakfast. 90 tablet 3    atorvastatin 20 MG Oral Tab Take 1 tablet (20 mg total) by mouth nightly. 90 tablet 3    Lancets (ONETOUCH DELICA PLUS [id number]) Does not apply Misc USE FOR BLOOD SUGAR TESTING DAILY 100 each 3    Glucose Blood (ONETOUCH VERIO) In Vitro Strip Use for blood sugar testing daily 100 strip 3    enalapril 10 MG Oral Tab Take 1 tablet (10 mg total) by mouth 2 (two) times daily.      Blood Glucose Monitoring Suppl (ONETOUCH VERIO IQ SYSTEM) w/Device Does not apply Kit Use for blood sugar testing daily 1 kit 1    aspirin 81 MG Oral Tab EC Take 1 tablet (81 mg total) by mouth daily.         Allergies:  Allergies[1]      ROS:       PHYSICAL EXAM:        ASSESSMENT/PLAN:   Assessment   Encounter Diagnoses   Name Primary?    Bladder mass Yes    BPH with obstruction/lower urinary tract symptoms        Recommend:  - Return to clinic in 6 months.  - Call the office if he has any questions or concerns.         Orders This  Visit:  No orders of the defined types were placed in this encounter.      Meds This Visit:  Requested Prescriptions      No prescriptions requested or ordered in this encounter       Imaging & Referrals:  None     2/20/2025  Sebastian Arceo MD               [1]   Allergies  Allergen Reactions    Bees SWELLING

## 2025-03-06 ENCOUNTER — TELEPHONE (OUTPATIENT)
Dept: INTERNAL MEDICINE CLINIC | Facility: CLINIC | Age: 80
End: 2025-03-06

## 2025-03-06 NOTE — TELEPHONE ENCOUNTER
Patient's wife Kala is calling to request the refill Enalapril  Patient has 3 days left of medication

## 2025-03-07 RX ORDER — ENALAPRIL MALEATE 10 MG/1
10 TABLET ORAL 2 TIMES DAILY
Qty: 180 TABLET | Refills: 3 | Status: SHIPPED | OUTPATIENT
Start: 2025-03-07

## 2025-03-17 DIAGNOSIS — E11.8 TYPE 2 DIABETES MELLITUS WITH COMPLICATION, WITHOUT LONG-TERM CURRENT USE OF INSULIN (HCC): Primary | ICD-10-CM

## 2025-03-19 RX ORDER — BLOOD SUGAR DIAGNOSTIC
STRIP MISCELLANEOUS
Qty: 100 STRIP | Refills: 3 | Status: SHIPPED | OUTPATIENT
Start: 2025-03-19

## 2025-03-19 RX ORDER — LANCETS 33 GAUGE
1 EACH MISCELLANEOUS DAILY
Qty: 100 EACH | Refills: 3 | Status: SHIPPED | OUTPATIENT
Start: 2025-03-19

## 2025-03-19 NOTE — TELEPHONE ENCOUNTER
Refill request is for a maintenance medication and has met the criteria specified in the Ambulatory Medication Refill Standing Order for eligibility, visits, laboratory, alerts and was sent to the requested pharmacy.    Requested Prescriptions     Signed Prescriptions Disp Refills    Glucose Blood (ONETOUCH VERIO) In Vitro Strip 100 strip 3     Sig: Use for blood sugar testing daily     Authorizing Provider: GODFREY RUBIN     Ordering User: EMILIE ARCHER    Lancets (ONETOUCH DELICA PLUS [id number]) Does not apply Misc 100 each 3     Si each by Other route daily. Use for blood sugar testing daily     Authorizing Provider: GODFREY RUBIN     Ordering User: EMILIE ARCHER

## 2025-04-15 ENCOUNTER — LAB ENCOUNTER (OUTPATIENT)
Dept: LAB | Facility: HOSPITAL | Age: 80
End: 2025-04-15
Attending: INTERNAL MEDICINE
Payer: MEDICARE

## 2025-04-15 DIAGNOSIS — E11.8 TYPE 2 DIABETES MELLITUS WITH COMPLICATION, WITHOUT LONG-TERM CURRENT USE OF INSULIN (HCC): ICD-10-CM

## 2025-04-15 LAB
ALBUMIN SERPL-MCNC: 4.7 G/DL (ref 3.2–4.8)
ALBUMIN/GLOB SERPL: 1.7 {RATIO} (ref 1–2)
ALP LIVER SERPL-CCNC: 72 U/L (ref 45–117)
ALT SERPL-CCNC: 29 U/L (ref 10–49)
ANION GAP SERPL CALC-SCNC: 8 MMOL/L (ref 0–18)
AST SERPL-CCNC: 25 U/L (ref ?–34)
BASOPHILS # BLD AUTO: 0.03 X10(3) UL (ref 0–0.2)
BASOPHILS NFR BLD AUTO: 0.4 %
BILIRUB SERPL-MCNC: 0.9 MG/DL (ref 0.2–1.1)
BILIRUB UR QL: NEGATIVE
BUN BLD-MCNC: 17 MG/DL (ref 9–23)
BUN/CREAT SERPL: 14.2 (ref 10–20)
CALCIUM BLD-MCNC: 9.3 MG/DL (ref 8.7–10.4)
CHLORIDE SERPL-SCNC: 104 MMOL/L (ref 98–112)
CHOLEST SERPL-MCNC: 99 MG/DL (ref ?–200)
CLARITY UR: CLEAR
CO2 SERPL-SCNC: 28 MMOL/L (ref 21–32)
CREAT BLD-MCNC: 1.2 MG/DL (ref 0.7–1.3)
CREAT UR-SCNC: 67.1 MG/DL
DEPRECATED RDW RBC AUTO: 44.5 FL (ref 35.1–46.3)
EGFRCR SERPLBLD CKD-EPI 2021: 61 ML/MIN/1.73M2 (ref 60–?)
EOSINOPHIL # BLD AUTO: 0.13 X10(3) UL (ref 0–0.7)
EOSINOPHIL NFR BLD AUTO: 1.8 %
ERYTHROCYTE [DISTWIDTH] IN BLOOD BY AUTOMATED COUNT: 13.2 % (ref 11–15)
EST. AVERAGE GLUCOSE BLD GHB EST-MCNC: 123 MG/DL (ref 68–126)
FASTING PATIENT LIPID ANSWER: YES
FASTING STATUS PATIENT QL REPORTED: YES
GLOBULIN PLAS-MCNC: 2.7 G/DL (ref 2–3.5)
GLUCOSE BLD-MCNC: 112 MG/DL (ref 70–99)
GLUCOSE UR-MCNC: NORMAL MG/DL
HBA1C MFR BLD: 5.9 % (ref ?–5.7)
HCT VFR BLD AUTO: 43.5 % (ref 39–53)
HDLC SERPL-MCNC: 36 MG/DL (ref 40–59)
HGB BLD-MCNC: 14.4 G/DL (ref 13–17.5)
HGB UR QL STRIP.AUTO: NEGATIVE
IMM GRANULOCYTES # BLD AUTO: 0.03 X10(3) UL (ref 0–1)
IMM GRANULOCYTES NFR BLD: 0.4 %
KETONES UR-MCNC: NEGATIVE MG/DL
LDLC SERPL CALC-MCNC: 47 MG/DL (ref ?–100)
LEUKOCYTE ESTERASE UR QL STRIP.AUTO: NEGATIVE
LYMPHOCYTES # BLD AUTO: 2.7 X10(3) UL (ref 1–4)
LYMPHOCYTES NFR BLD AUTO: 37.4 %
MCH RBC QN AUTO: 30.3 PG (ref 26–34)
MCHC RBC AUTO-ENTMCNC: 33.1 G/DL (ref 31–37)
MCV RBC AUTO: 91.6 FL (ref 80–100)
MICROALBUMIN UR-MCNC: <0.3 MG/DL
MONOCYTES # BLD AUTO: 0.74 X10(3) UL (ref 0.1–1)
MONOCYTES NFR BLD AUTO: 10.3 %
NEUTROPHILS # BLD AUTO: 3.58 X10 (3) UL (ref 1.5–7.7)
NEUTROPHILS # BLD AUTO: 3.58 X10(3) UL (ref 1.5–7.7)
NEUTROPHILS NFR BLD AUTO: 49.7 %
NITRITE UR QL STRIP.AUTO: NEGATIVE
NONHDLC SERPL-MCNC: 63 MG/DL (ref ?–130)
OSMOLALITY SERPL CALC.SUM OF ELEC: 292 MOSM/KG (ref 275–295)
PH UR: 5 [PH] (ref 5–8)
PLATELET # BLD AUTO: 156 10(3)UL (ref 150–450)
POTASSIUM SERPL-SCNC: 4.6 MMOL/L (ref 3.5–5.1)
PROT SERPL-MCNC: 7.4 G/DL (ref 5.7–8.2)
PROT UR-MCNC: NEGATIVE MG/DL
RBC # BLD AUTO: 4.75 X10(6)UL (ref 3.8–5.8)
SODIUM SERPL-SCNC: 140 MMOL/L (ref 136–145)
SP GR UR STRIP: 1.01 (ref 1–1.03)
TRIGL SERPL-MCNC: 80 MG/DL (ref 30–149)
TSI SER-ACNC: 2.01 UIU/ML (ref 0.55–4.78)
UROBILINOGEN UR STRIP-ACNC: NORMAL
VLDLC SERPL CALC-MCNC: 11 MG/DL (ref 0–30)
WBC # BLD AUTO: 7.2 X10(3) UL (ref 4–11)

## 2025-04-15 PROCEDURE — 36415 COLL VENOUS BLD VENIPUNCTURE: CPT

## 2025-04-15 PROCEDURE — 83036 HEMOGLOBIN GLYCOSYLATED A1C: CPT

## 2025-04-15 PROCEDURE — 84443 ASSAY THYROID STIM HORMONE: CPT

## 2025-04-15 PROCEDURE — 80053 COMPREHEN METABOLIC PANEL: CPT

## 2025-04-15 PROCEDURE — 82570 ASSAY OF URINE CREATININE: CPT

## 2025-04-15 PROCEDURE — 85025 COMPLETE CBC W/AUTO DIFF WBC: CPT

## 2025-04-15 PROCEDURE — 80061 LIPID PANEL: CPT

## 2025-04-15 PROCEDURE — 81003 URINALYSIS AUTO W/O SCOPE: CPT

## 2025-04-15 PROCEDURE — 82043 UR ALBUMIN QUANTITATIVE: CPT

## 2025-04-21 ENCOUNTER — OFFICE VISIT (OUTPATIENT)
Dept: INTERNAL MEDICINE CLINIC | Facility: CLINIC | Age: 80
End: 2025-04-21
Payer: COMMERCIAL

## 2025-04-21 VITALS
HEIGHT: 70 IN | TEMPERATURE: 98 F | WEIGHT: 193 LBS | SYSTOLIC BLOOD PRESSURE: 140 MMHG | DIASTOLIC BLOOD PRESSURE: 76 MMHG | BODY MASS INDEX: 27.63 KG/M2 | HEART RATE: 65 BPM | OXYGEN SATURATION: 98 %

## 2025-04-21 DIAGNOSIS — E11.8 TYPE 2 DIABETES MELLITUS WITH COMPLICATION, WITHOUT LONG-TERM CURRENT USE OF INSULIN (HCC): Primary | ICD-10-CM

## 2025-04-21 PROCEDURE — 3077F SYST BP >= 140 MM HG: CPT | Performed by: INTERNAL MEDICINE

## 2025-04-21 PROCEDURE — 1160F RVW MEDS BY RX/DR IN RCRD: CPT | Performed by: INTERNAL MEDICINE

## 2025-04-21 PROCEDURE — 99214 OFFICE O/P EST MOD 30 MIN: CPT | Performed by: INTERNAL MEDICINE

## 2025-04-21 PROCEDURE — 3078F DIAST BP <80 MM HG: CPT | Performed by: INTERNAL MEDICINE

## 2025-04-21 PROCEDURE — 1126F AMNT PAIN NOTED NONE PRSNT: CPT | Performed by: INTERNAL MEDICINE

## 2025-04-21 PROCEDURE — 3008F BODY MASS INDEX DOCD: CPT | Performed by: INTERNAL MEDICINE

## 2025-04-21 PROCEDURE — 1159F MED LIST DOCD IN RCRD: CPT | Performed by: INTERNAL MEDICINE

## 2025-04-21 RX ORDER — CYCLOBENZAPRINE HCL 5 MG
5 TABLET ORAL NIGHTLY PRN
Qty: 30 TABLET | Refills: 0 | Status: SHIPPED | OUTPATIENT
Start: 2025-04-21

## 2025-04-21 NOTE — PROGRESS NOTES
Yves Love is a 79 year old male  Chief Complaint   Patient presents with    Follow - Up     Pt here for 6 month f/u      HPI:   Yves Love is a 79 year old male who presents for a follow up.    Having upper back spasm. Improved w/lidocaine patch. Asking for muscle relaxer.    Had labs 4/15/25.    Wt Readings from Last 6 Encounters:   04/21/25 193 lb (87.5 kg)   02/05/25 185 lb (83.9 kg)   10/15/24 191 lb 9.6 oz (86.9 kg)   09/17/24 190 lb (86.2 kg)   02/13/24 185 lb (83.9 kg)   12/13/23 185 lb (83.9 kg)     Body mass index is 27.69 kg/m².     Current Medications[1]   Past Medical History[2]   Past Surgical History[3]   Family History[4]   Social History:  Short Social Hx on File[5]        REVIEW OF SYSTEMS:   GENERAL: feels well otherwise  MSK: + back spasm    EXAM:   /76   Pulse 65   Temp 97.9 °F (36.6 °C)   Ht 5' 10\" (1.778 m)   Wt 193 lb (87.5 kg)   SpO2 98%   BMI 27.69 kg/m²     GENERAL: well developed, well nourished, in no apparent distress  NEURO: A&O x 3, moves all 4 extremities normally      ASSESSMENT AND PLAN:   Yves Love is a 79 year old male who presents for a complete physical exam.     Back spasm  - ok to use lidocaine patches, heating pad PRN  - rx flexeril 5 mg at bedtime PRN, discussed side effects advised to avoid w/ETOH use and no operating heavy machinery    DM2   - Most recent hemoglobin A1c: 5.9% 4/15/25  - pre-prandial sugars: checks once a week or every 2 week - 138  - Current regimen:               - metformin 500 mg daily  - Complications:               - CV: atorvastatin 20 mg qhs               - Nephropathy: microalb/cr ordered               - Neuropathy: denies               - Retinopathy: none, last exam on 7/3/24  - Foot exam: at annual physical     CAD s/p PCI to LAD-diag 8/2023  - Cardiologist: Dr. Castro  - ASA 81 mg, atorvastatin 20 mg qhs     HTN  - current regimen:  - enalapril 10 mg bid  - elevated today but pt didn't take his bp meds yet,  states it is always controlled at home     HLD  - atorvastatin 20 mg at bedtime  - LDL 48 9/16/24     BPH w/LUTS  - urologist: Dr. Arceo  - s/p greenlight laser vaporization of the prostate 12/13/23     CKD3a  - monitor bmp twice yearly, avoid NSAIDs     PAD   - s/p iliac stenting w/interventional radiology, asx     Healthcare Maintenance  Cancer Screenings:  - Colon: age 79  - Lung: quit 15 years ago, recommend LDCT scan, pt declines at this time     Vaccines:  - Tdap: recommend if >10 years since prior  - Shingles: shingrix recommended  - Pneumonia: prevnar 13 3/14/18, pneumovax 23 10/27/09, prevnar 20 recommended  - Flu: recommend yearly (10/15/24)  - COVID-19: x4     Misc:  - AAA screen: negative CTA A/P 7/6/20  - PSA screen: 0.28 10/27/23     RTC in 6 months or sooner PRN.    For E/M code - 30 minutes spent reviewing performing chart review, obtaining a history, performing a physical exam, reviewing the assessment/plan, placing orders, and completing documentation.     Esther Basurto DO  4/21/2025  8:09 AM         [1]   Current Outpatient Medications   Medication Sig Dispense Refill    metFORMIN 500 MG Oral Tab Take 1 tablet (500 mg total) by mouth daily with breakfast. 90 tablet 3    cyclobenzaprine 5 MG Oral Tab Take 1 tablet (5 mg total) by mouth nightly as needed for Muscle spasms. 30 tablet 0    Glucose Blood (ONETOUCH VERIO) In Vitro Strip Use for blood sugar testing daily 100 strip 3    Lancets (ONETOUCH DELICA PLUS [id number]) Does not apply Misc 1 each by Other route daily. Use for blood sugar testing daily 100 each 3    enalapril 10 MG Oral Tab Take 1 tablet (10 mg total) by mouth 2 (two) times daily. 180 tablet 3    atorvastatin 20 MG Oral Tab Take 1 tablet (20 mg total) by mouth nightly. 90 tablet 3    Blood Glucose Monitoring Suppl (ONETOUCH VERIO IQ SYSTEM) w/Device Does not apply Kit Use for blood sugar testing daily 1 kit 1    aspirin 81 MG Oral Tab EC Take 1 tablet (81 mg total) by mouth  daily.     [2]   Past Medical History:   Arthritis    Coronary atherosclerosis    Diabetes (HCC)    Esophageal reflux    High blood pressure    High cholesterol    Hyperlipidemia    Peripheral vascular disease    S/P TURP (transurethral resection of prostate)    Unspecified essential hypertension    Visual impairment    reading   [3]   Past Surgical History:  Procedure Laterality Date    Adenoidectomy      As a young child    Cath bare metal stent (bms)  2023    patient goes to outpatient cardiac rehab on     Colonoscopy  5 yrs ago    Done by Dr. Trevino at [place]    Hand/finger surgery unlisted Right 2013    R thumb reconstructive surgery - successful    Ir angioplasty lower ext. endo revasc Bilateral     stent placement    Knee arthroscopy Right 2003    Other surgical history  2008    Dr. Holman @ [facility]    T&a      Tonsillectomy  Do not recall..    As a child   [4]   Family History  Problem Relation Age of Onset    Cancer Father         Legal name was Abundio (Ukrainian for Dustin)    Stroke Mother 65        /blood clot to the brain    Hypertension Mother         Martha Pratt (maiden name)    Testicular Cancer Son     Diabetes Maternal Grandmother     Cancer Sister 40    Cancer Sister         Brain cancer ()    Cancer Brother          / colon cancer   [5]   Social History  Socioeconomic History    Marital status:    Tobacco Use    Smoking status: Former     Current packs/day: 0.00     Average packs/day: 1 pack/day for 50.0 years (50.0 ttl pk-yrs)     Types: Cigarettes     Start date: 6/15/1959     Quit date: 6/15/2009     Years since quitting: 15.8    Smokeless tobacco: Never   Vaping Use    Vaping status: Never Used   Substance and Sexual Activity    Alcohol use: Yes     Alcohol/week: 2.0 standard drinks of alcohol     Types: 2 Cans of beer per week     Comment: Socially and while grilling    Drug use: Never   Other Topics  Concern    Caffeine Concern Yes     Comment: Coffee 3 cups daily

## 2025-07-23 ENCOUNTER — OFFICE VISIT (OUTPATIENT)
Age: 80
End: 2025-07-23
Payer: COMMERCIAL

## 2025-07-23 VITALS
DIASTOLIC BLOOD PRESSURE: 60 MMHG | HEART RATE: 68 BPM | HEIGHT: 70 IN | BODY MASS INDEX: 27.49 KG/M2 | WEIGHT: 192 LBS | OXYGEN SATURATION: 97 % | SYSTOLIC BLOOD PRESSURE: 130 MMHG

## 2025-07-23 DIAGNOSIS — Z87.891 FORMER SMOKER: ICD-10-CM

## 2025-07-23 DIAGNOSIS — I73.9 PVD (PERIPHERAL VASCULAR DISEASE): ICD-10-CM

## 2025-07-23 DIAGNOSIS — J94.8 PLEURAL CALCIFICATION: ICD-10-CM

## 2025-07-23 DIAGNOSIS — I25.10 CORONARY ARTERY DISEASE DUE TO CALCIFIED CORONARY LESION: ICD-10-CM

## 2025-07-23 DIAGNOSIS — E11.8 TYPE 2 DIABETES MELLITUS WITH COMPLICATION, WITHOUT LONG-TERM CURRENT USE OF INSULIN (HCC): Primary | ICD-10-CM

## 2025-07-23 DIAGNOSIS — I25.84 CORONARY ARTERY DISEASE DUE TO CALCIFIED CORONARY LESION: ICD-10-CM

## 2025-07-23 DIAGNOSIS — I10 ESSENTIAL HYPERTENSION: ICD-10-CM

## 2025-07-23 DIAGNOSIS — Z90.79 S/P TURP (TRANSURETHRAL RESECTION OF PROSTATE): ICD-10-CM

## 2025-07-23 DIAGNOSIS — Z95.5 HISTORY OF CORONARY ANGIOPLASTY WITH INSERTION OF STENT: ICD-10-CM

## 2025-07-23 PROCEDURE — 3075F SYST BP GE 130 - 139MM HG: CPT | Performed by: INTERNAL MEDICINE

## 2025-07-23 PROCEDURE — 1159F MED LIST DOCD IN RCRD: CPT | Performed by: INTERNAL MEDICINE

## 2025-07-23 PROCEDURE — 3078F DIAST BP <80 MM HG: CPT | Performed by: INTERNAL MEDICINE

## 2025-07-23 PROCEDURE — 3008F BODY MASS INDEX DOCD: CPT | Performed by: INTERNAL MEDICINE

## 2025-07-23 PROCEDURE — 99204 OFFICE O/P NEW MOD 45 MIN: CPT | Performed by: INTERNAL MEDICINE

## 2025-07-23 NOTE — PROGRESS NOTES
History of Present Illness  Yves Love is an 79 year old male with coronary artery disease and diabetes who presents to establish care  and for  follow-up  of  Cad  and  and dm    He is accompanied by his wife.    He underwent a heart stent placement this past winter following a calcium score test that showed a score just under 1000. , an angiogram revealed the need for a stent. He has a significant smoking history of 50 years but quit 15 years ago. NO chf  SX      He has a history of 'borderline' diabetes with a last A1c of 5.9. He monitors his glucose levels, which are mostly under 120 in the morning, occasionally reaching the 130s. He takes metformin and follows dietary modifications, such as avoiding sugary drinks and not adding sugar to coffee.    He has undergone prostate surgery twice, initially traditionally and later with a laser. Post-surgery, he experienced nocturia, getting up two to three times a night, which improved after a bladder biopsy. Recently, he has been getting up twice a night again. DISCUSSED  bph  He manages constipation with Miralax.    He has a history of stents in his groin for leg circulation issues. He remains active, engaging in yard work, walking on a treadmill daily, and bowling twice a week. He consumes beer occasionally and has a history of working as a , potentially exposing him to [place] dust and asbestos tiles.    HAS NO  STROKE  SXS      The patient denies symptoms of depression or dementia. No use of inhalers or diagnosis of emphysema. A CT scan as part of a calcium score showed pleural calcifications, but no emphysema. He has been\" monitored \"for potential asbestos exposure with regular x-rays. WORKED  A         HISTORY:  Past Medical History[1]   Past Surgical History[2]   Family History[3]   Short Social Hx on File[4]               Patient Active Problem List    Diagnosis Date Noted    Bladder mass 02/05/2025    PVD  (pulmonary valve disease) 05/01/2023    Prostatism 06/27/2022    Bursitis of right shoulder 12/15/2021    Smokers' cough (HCC) 09/29/2020    Hypercholesteremia 09/29/2020    Claudication of left lower extremity 07/01/2020    Nodule of apex of right lung 06/29/2020    Type 2 diabetes mellitus with complication, without long-term current use of insulin (HCC) 06/29/2020    PVD (peripheral vascular disease) 06/29/2020    CKD (chronic kidney disease) stage 3, GFR 30-59 ml/min ([facility]) 06/29/2020    Encounter for Medicare annual wellness exam 03/13/2017    Essential hypertension 01/20/2012    S/P TURP (transurethral resection of prostate) 2007        Medications - Current[5]     Vitals:    07/23/25 1136   BP: 130/60   Pulse: 68   VITALSBody mass index is 27.55 kg/m².      General: looks healthy     cognition  normal       Neck;  nl  NO BRUIT    Lungs: clear ( WAS HEAVY SMOKER )    Heart: Regular     Murmur none     No edema                     Yves was seen today for physical.    Diagnoses and all orders for this visit:    Type 2 diabetes mellitus with complication, without long-term current use of insulin ([facility])    PVD (peripheral vascular disease)    Essential hypertension    S/P TURP (transurethral resection of prostate)    Former smoker    Coronary artery disease due to calcified coronary lesion    History of coronary angioplasty with insertion of stent    Pleural calcification  -     CT CHEST HI RESOLUTION (CPT=71250); Future       Assessment & Plan  Coronary Artery Disease with Stent Placement  Coronary artery disease with stent placement post-angiogram due to high calcium score. Smoking history significant risk factor. Cholesterol medication and aspirin therapy essential for plaque stabilization and secondary prevention.  - Continue cholesterol medication.  - Continue aspirin therapy.    BP at [facility]    Borderline Diabetes Mellitus  Borderline diabetes with fasting glucose 116 mg/dL and A1c 5.9%. On metformin to  maintain glucose control and prevent complications, especially given coronary artery disease.  - Continue metformin.    Benign Prostatic Hyperplasia with Post-Surgical Urinary Symptoms  Post-surgical nocturia improved. Medication for overactive bladder discussed, considering side effects and symptom severity.  - Consider medication for overactive bladder if symptoms worsen.    Pulmonary Calcifications  Pleural calcifications with potential asbestos exposure. High-resolution CT scan recommended for further evaluation.  - Consider high-resolution CT scan if recommended by radiologist.    Ex smoker      No PAD sx  - cpm          General Health Maintenance  Former smoker, engages in regular physical activity, follows healthy diet, received four COVID-19 vaccinations, had COVID-19 three times, opted against further vaccinations.  - Encourage continued physical activity and healthy diet.             This note was prepared using Dragon Medical voice recognition dictation software and as a result, errors may occur. When identified, these errors have been corrected. While every attempt is made to correct errors during dictation, discrepancies may still exist          [1]   Past Medical History:   Arthritis    Coronary atherosclerosis    Diabetes (HCC)    Esophageal reflux    High blood pressure    High cholesterol    Hyperlipidemia    Peripheral vascular disease    S/P TURP (transurethral resection of prostate)    Unspecified essential hypertension    Visual impairment    reading   [2]   Past Surgical History:  Procedure Laterality Date    Adenoidectomy      As a young child    Cath bare metal stent (bms)  07/2023    patient goes to outpatient cardiac rehab on mon wed fri    Colonoscopy  5 yrs ago    Done by Dr. Trevino at [place]    Hand/finger surgery unlisted Right 01/01/2013    R thumb reconstructive surgery - successful    Ir angioplasty lower ext. endo revasc Bilateral     stent placement    Knee arthroscopy Right  2003    Other surgical history  2008    Dr. Holman @ [facility]    T&a      Tonsillectomy  Do not recall..    As a child   [3]   Family History  Problem Relation Age of Onset    Cancer Father         Legal name was Abundio (Kazakh for Dustin)    Stroke Mother 65        /blood clot to the brain    Hypertension Mother         Martha Pratt (maiden name)    Testicular Cancer Son     Diabetes Maternal Grandmother     Cancer Sister 40    Cancer Sister         Brain cancer ()    Cancer Brother          / colon cancer   [4]   Social History  Socioeconomic History    Marital status:    Tobacco Use    Smoking status: Former     Current packs/day: 0.00     Average packs/day: 1 pack/day for 50.0 years (50.0 ttl pk-yrs)     Types: Cigarettes     Start date: 6/15/1959     Quit date: 6/15/2009     Years since quittin.1    Smokeless tobacco: Never   Vaping Use    Vaping status: Never Used   Substance and Sexual Activity    Alcohol use: Yes     Alcohol/week: 2.0 standard drinks of alcohol     Types: 2 Cans of beer per week     Comment: Socially and while grilling    Drug use: Never   Other Topics Concern    Caffeine Concern Yes     Comment: Coffee 3 cups daily   [5]   Current Outpatient Medications:     metFORMIN 500 MG Oral Tab, Take 1 tablet (500 mg total) by mouth daily with breakfast., Disp: 90 tablet, Rfl: 3    Glucose Blood (ONETOUCH VERIO) In Vitro Strip, Use for blood sugar testing daily, Disp: 100 strip, Rfl: 3    Lancets (ONETOUCH DELICA PLUS [id number]) Does not apply Misc, 1 each by Other route daily. Use for blood sugar testing daily, Disp: 100 each, Rfl: 3    enalapril 10 MG Oral Tab, Take 1 tablet (10 mg total) by mouth 2 (two) times daily., Disp: 180 tablet, Rfl: 3    atorvastatin 20 MG Oral Tab, Take 1 tablet (20 mg total) by mouth nightly., Disp: 90 tablet, Rfl: 3    Blood Glucose Monitoring Suppl (ONETOUCH VERIO IQ SYSTEM) w/Device Does not apply  Kit, Use for blood sugar testing daily, Disp: 1 kit, Rfl: 1    aspirin 81 MG Oral Tab EC, Take 1 tablet (81 mg total) by mouth daily., Disp: , Rfl:

## 2025-07-23 NOTE — PROGRESS NOTES
The following individual(s) verbally consented to be recorded using ambient AI listening technology and understand that they can each withdraw their consent to this listening technology at any point by asking the clinician to turn off or pause the recording: YES    Patient name: Yves Love  Additional names:

## 2025-08-21 ENCOUNTER — OFFICE VISIT (OUTPATIENT)
Dept: SURGERY | Facility: CLINIC | Age: 80
End: 2025-08-21

## 2025-08-21 VITALS — BODY MASS INDEX: 27.2 KG/M2 | WEIGHT: 190 LBS | HEIGHT: 70 IN

## 2025-08-21 DIAGNOSIS — N40.1 BPH WITH OBSTRUCTION/LOWER URINARY TRACT SYMPTOMS: Primary | ICD-10-CM

## 2025-08-21 DIAGNOSIS — N13.8 BPH WITH OBSTRUCTION/LOWER URINARY TRACT SYMPTOMS: Primary | ICD-10-CM

## 2025-08-21 DIAGNOSIS — R33.9 URINARY RETENTION: ICD-10-CM

## 2025-08-21 DIAGNOSIS — R31.0 GROSS HEMATURIA: ICD-10-CM

## 2025-08-21 PROCEDURE — 99213 OFFICE O/P EST LOW 20 MIN: CPT | Performed by: PHYSICIAN ASSISTANT

## 2025-08-21 PROCEDURE — 1159F MED LIST DOCD IN RCRD: CPT | Performed by: PHYSICIAN ASSISTANT

## 2025-08-21 PROCEDURE — 1160F RVW MEDS BY RX/DR IN RCRD: CPT | Performed by: PHYSICIAN ASSISTANT

## 2025-08-21 PROCEDURE — 3008F BODY MASS INDEX DOCD: CPT | Performed by: PHYSICIAN ASSISTANT

## 2025-08-22 ENCOUNTER — HOSPITAL ENCOUNTER (OUTPATIENT)
Dept: CT IMAGING | Facility: HOSPITAL | Age: 80
Discharge: HOME | End: 2025-08-22
Attending: INTERNAL MEDICINE

## 2025-08-22 DIAGNOSIS — J94.8 PLEURAL CALCIFICATION: ICD-10-CM

## 2025-08-22 PROCEDURE — 71250 CT THORAX DX C-: CPT | Performed by: INTERNAL MEDICINE

## 2025-09-01 ENCOUNTER — RESULTS FOLLOW-UP (OUTPATIENT)
Age: 80
End: 2025-09-01

## (undated) DEVICE — SLEEVE COMPR M KNEE LEN SGL USE KENDALL SCD

## (undated) DEVICE — GAMMEX® PI HYBRID SIZE 8, STERILE POWDER-FREE SURGICAL GLOVE, POLYISOPRENE AND NEOPRENE BLEND: Brand: GAMMEX

## (undated) DEVICE — SOLUTION IRRIG 3000ML 0.9% NACL FLX CONT

## (undated) DEVICE — SLEEVE COMPR MD KNEE LEN SGL USE KENDALL SCD

## (undated) DEVICE — DEVICE SECURE TRICOT FOLEY 3WY

## (undated) DEVICE — FIBER FOR XPS LASER

## (undated) DEVICE — URINE DRAINAGE BAG,NEEDLE SAMPLING, ANTI-REFLUX DEVICE, DRAIN TUBE: Brand: DOVER

## (undated) DEVICE — CYSTO PACK: Brand: MEDLINE INDUSTRIES, INC.

## (undated) DEVICE — SOLUTION IRRIG 1000ML ST H2O AQUALITE PLAS

## (undated) DEVICE — GREENLIGHT TECH FEE

## (undated) DEVICE — PACK CUSTOM CYSTO

## (undated) DEVICE — UROLOGY DRAIN BAG

## (undated) DEVICE — SET ADMIN 16ML TBNG L100IN ID0.100IN MACBOR

## (undated) DEVICE — SOLUTION IV 1000ML 0.9% NACL PRESERVATIVE

## (undated) DEVICE — PLUG CATH CAP DRN STRL LF

## (undated) DEVICE — CATHETER URETH 20FR BLLN 30CC LTX 3 W STD TIP

## (undated) NOTE — LETTER
[address], [place]  Authorization for Invasive Procedures  1.  I hereby authorize  Dr. Renato Patiño , my physician and whomever may be designated as the doctor's assistant, to perform the following operation and/or procedure:  Left Lower following are some, but not all, of the potential risks that can occur: fever and allergic reactions, hemolytic reactions, transmission of disease such as hepatitis, AIDS, cytomegalovirus (CMV), and flluid overload.  In the event that I wish to have autolog administration of sedation/analgesia as may be necessary or desirable in the judgment of my physician.      Signature of Patient:  ________________________________________________ Date: _________Time: _________    Responsible person in case of minor or unco

## (undated) NOTE — MR AVS SNAPSHOT
[facility] [place]  [facility] [address]  [phone]               Thank you for choosing us for your health care visit with Kadie Garcia MD.  We are glad to serve you and happy to provide you with this summary of your visit.   Lyndsey

## (undated) NOTE — ED AVS SNAPSHOT
Markos Sibley   MRN: [id number]    Department:  [facility] Emergency Department   Date of Visit:  1/25/2019           Disclosure     Insurance plans vary and the physician(s) referred by the ER may not be covered by your plan.  Please conta within the next three months to obtain basic health screening including reassessment of your blood pressure.     IF THERE IS ANY CHANGE OR WORSENING OF YOUR CONDITION, CALL YOUR PRIMARY CARE PHYSICIAN AT ONCE OR RETURN IMMEDIATELY TO THE EMERGENCY DEPARTMEN